# Patient Record
Sex: FEMALE | Race: WHITE | NOT HISPANIC OR LATINO | URBAN - METROPOLITAN AREA
[De-identification: names, ages, dates, MRNs, and addresses within clinical notes are randomized per-mention and may not be internally consistent; named-entity substitution may affect disease eponyms.]

---

## 2017-07-05 ENCOUNTER — OFFICE VISIT - RIVER FALLS (OUTPATIENT)
Dept: FAMILY MEDICINE | Facility: CLINIC | Age: 37
End: 2017-07-05

## 2017-07-05 ASSESSMENT — MIFFLIN-ST. JEOR: SCORE: 1815.54

## 2017-09-28 ENCOUNTER — COMMUNICATION - RIVER FALLS (OUTPATIENT)
Dept: FAMILY MEDICINE | Facility: CLINIC | Age: 37
End: 2017-09-28

## 2017-09-28 ENCOUNTER — AMBULATORY - RIVER FALLS (OUTPATIENT)
Dept: FAMILY MEDICINE | Facility: CLINIC | Age: 37
End: 2017-09-28

## 2017-09-29 LAB
CHOLEST SERPL-MCNC: 256 MG/DL
CHOLEST/HDLC SERPL: 3.7 {RATIO}
CREAT SERPL-MCNC: 1.07 MG/DL (ref 0.5–1.1)
GLUCOSE BLD-MCNC: 101 MG/DL (ref 65–99)
HBA1C MFR BLD: 4.9 %
HDLC SERPL-MCNC: 70 MG/DL
LDLC SERPL CALC-MCNC: 162 MG/DL
NONHDLC SERPL-MCNC: 186 MG/DL
TRIGL SERPL-MCNC: 119 MG/DL

## 2017-10-10 ENCOUNTER — OFFICE VISIT - RIVER FALLS (OUTPATIENT)
Dept: FAMILY MEDICINE | Facility: CLINIC | Age: 37
End: 2017-10-10

## 2017-10-10 ASSESSMENT — MIFFLIN-ST. JEOR: SCORE: 1806.47

## 2017-10-13 ENCOUNTER — AMBULATORY - RIVER FALLS (OUTPATIENT)
Dept: FAMILY MEDICINE | Facility: CLINIC | Age: 37
End: 2017-10-13

## 2017-10-30 ENCOUNTER — AMBULATORY - RIVER FALLS (OUTPATIENT)
Dept: FAMILY MEDICINE | Facility: CLINIC | Age: 37
End: 2017-10-30

## 2017-11-29 ENCOUNTER — AMBULATORY - RIVER FALLS (OUTPATIENT)
Dept: FAMILY MEDICINE | Facility: CLINIC | Age: 37
End: 2017-11-29

## 2017-12-05 ENCOUNTER — OFFICE VISIT - RIVER FALLS (OUTPATIENT)
Dept: FAMILY MEDICINE | Facility: CLINIC | Age: 37
End: 2017-12-05

## 2017-12-05 ASSESSMENT — MIFFLIN-ST. JEOR: SCORE: 1790.14

## 2017-12-07 ENCOUNTER — AMBULATORY - RIVER FALLS (OUTPATIENT)
Dept: FAMILY MEDICINE | Facility: CLINIC | Age: 37
End: 2017-12-07

## 2017-12-12 LAB
CREAT SERPL-MCNC: 0.93 MG/DL (ref 0.5–1.1)
GLUCOSE BLD-MCNC: 93 MG/DL (ref 65–99)

## 2018-01-29 ENCOUNTER — OFFICE VISIT - RIVER FALLS (OUTPATIENT)
Dept: FAMILY MEDICINE | Facility: CLINIC | Age: 38
End: 2018-01-29

## 2018-01-29 ASSESSMENT — MIFFLIN-ST. JEOR: SCORE: 1796.49

## 2018-02-19 ENCOUNTER — OFFICE VISIT - RIVER FALLS (OUTPATIENT)
Dept: FAMILY MEDICINE | Facility: CLINIC | Age: 38
End: 2018-02-19

## 2018-02-19 ASSESSMENT — MIFFLIN-ST. JEOR: SCORE: 1775.62

## 2018-06-05 ENCOUNTER — OFFICE VISIT - RIVER FALLS (OUTPATIENT)
Dept: FAMILY MEDICINE | Facility: CLINIC | Age: 38
End: 2018-06-05

## 2018-06-05 ASSESSMENT — MIFFLIN-ST. JEOR: SCORE: 1767.46

## 2018-06-14 ENCOUNTER — OFFICE VISIT - RIVER FALLS (OUTPATIENT)
Dept: FAMILY MEDICINE | Facility: CLINIC | Age: 38
End: 2018-06-14

## 2018-06-14 ASSESSMENT — MIFFLIN-ST. JEOR: SCORE: 1765.64

## 2018-08-22 ENCOUNTER — OFFICE VISIT - RIVER FALLS (OUTPATIENT)
Dept: FAMILY MEDICINE | Facility: CLINIC | Age: 38
End: 2018-08-22

## 2018-09-26 ENCOUNTER — OFFICE VISIT - RIVER FALLS (OUTPATIENT)
Dept: FAMILY MEDICINE | Facility: CLINIC | Age: 38
End: 2018-09-26

## 2018-09-26 ASSESSMENT — MIFFLIN-ST. JEOR: SCORE: 1739.34

## 2018-12-20 ENCOUNTER — OFFICE VISIT - RIVER FALLS (OUTPATIENT)
Dept: FAMILY MEDICINE | Facility: CLINIC | Age: 38
End: 2018-12-20

## 2019-01-01 ENCOUNTER — AMBULATORY - RIVER FALLS (OUTPATIENT)
Dept: FAMILY MEDICINE | Facility: CLINIC | Age: 39
End: 2019-01-01

## 2019-01-01 ENCOUNTER — COMMUNICATION - RIVER FALLS (OUTPATIENT)
Dept: FAMILY MEDICINE | Facility: CLINIC | Age: 39
End: 2019-01-01

## 2019-01-01 ENCOUNTER — OFFICE VISIT - RIVER FALLS (OUTPATIENT)
Dept: FAMILY MEDICINE | Facility: CLINIC | Age: 39
End: 2019-01-01

## 2019-01-01 LAB
ALT SERPL W P-5'-P-CCNC: 151 UNIT/L
AST SERPL W P-5'-P-CCNC: 135 UNIT/L
BUN SERPL-MCNC: 34 MG/DL
BUN SERPL-MCNC: 8 MG/DL (ref 7–25)
BUN/CREAT RATIO - HISTORICAL: ABNORMAL (ref 6–22)
CALCIUM SERPL-MCNC: 11 MEQ/DL
CALCIUM SERPL-MCNC: 9.9 MG/DL (ref 8.6–10.2)
CHLORIDE BLD-SCNC: 102 MMOL/L (ref 98–110)
CHOL/HDL RATIO - HISTORICAL: 3.6 UMOL/L
CHOLEST SERPL-MCNC: 272 MG/DL
CO2 SERPL-SCNC: 24 MMOL/L (ref 20–32)
CREAT SERPL-MCNC: 0.73 MG/DL (ref 0.5–1.1)
CREAT SERPL-MCNC: 2.01 MG/DL
EGFRCR SERPLBLD CKD-EPI 2021: 106 ML/MIN/1.73M2
GFR ESTIMATE EXT - HISTORICAL: 28 ML/MIN
GLUCOSE BLD-MCNC: 111 MG/DL (ref 65–99)
HBA1C MFR BLD: 4.4 %
HCT VFR BLD AUTO: 33.8 %
HDLC SERPL-MCNC: 75 MG/DL
HGB BLD-MCNC: 10.7 G/DL
LDLC SERPL CALC-MCNC: 174 MG/DL
PLATELET # BLD AUTO: 115 X10
POTASSIUM BLD-SCNC: 4.7 MMOL/L (ref 3.5–5.3)
POTASSIUM BLD-SCNC: 4.8 MEQ/L
SODIUM SERPL-SCNC: 137 MEQ/L
SODIUM SERPL-SCNC: 137 MMOL/L (ref 135–146)
T4 FREE SERPL-MCNC: 1.14 NG/DL
TRIGL SERPL-MCNC: 114 MG/DL
TSH SERPL DL<=0.005 MIU/L-ACNC: 4.78 MIU/L
VIT B12 SERPL-MCNC: 535 PG/ML
VITAMIN D, 1, 25-DIHYDROXY - QUEST: 72 NG/ML
VLDL-CHOLESTEROL: 23
WBC # BLD AUTO: 4.6 X10

## 2019-01-01 ASSESSMENT — MIFFLIN-ST. JEOR
SCORE: 1763.83
SCORE: 1657.69

## 2019-03-01 ENCOUNTER — OFFICE VISIT - RIVER FALLS (OUTPATIENT)
Dept: FAMILY MEDICINE | Facility: CLINIC | Age: 39
End: 2019-03-01

## 2019-03-01 ASSESSMENT — MIFFLIN-ST. JEOR: SCORE: 1711.21

## 2020-01-01 ENCOUNTER — COMMUNICATION - RIVER FALLS (OUTPATIENT)
Dept: FAMILY MEDICINE | Facility: CLINIC | Age: 40
End: 2020-01-01

## 2020-01-01 ENCOUNTER — OFFICE VISIT - RIVER FALLS (OUTPATIENT)
Dept: FAMILY MEDICINE | Facility: CLINIC | Age: 40
End: 2020-01-01

## 2020-01-01 ENCOUNTER — AMBULATORY - RIVER FALLS (OUTPATIENT)
Dept: FAMILY MEDICINE | Facility: CLINIC | Age: 40
End: 2020-01-01

## 2020-01-01 LAB
A/G RATIO - HISTORICAL: 1.5 (ref 1–2.5)
ALBUMIN SERPL-MCNC: 3.7 GM/DL (ref 3.6–5.1)
ALP SERPL-CCNC: 167 UNIT/L (ref 31–125)
ALT SERPL W P-5'-P-CCNC: 73 UNIT/L (ref 6–29)
AST SERPL W P-5'-P-CCNC: 140 UNIT/L (ref 10–30)
BILIRUB SERPL-MCNC: 1.4 MG/DL (ref 0.2–1.2)
BUN SERPL-MCNC: 11 MG/DL (ref 7–25)
BUN/CREAT RATIO - HISTORICAL: ABNORMAL (ref 6–22)
CALCIUM SERPL-MCNC: 9.8 MG/DL (ref 8.6–10.2)
CHLORIDE BLD-SCNC: 98 MMOL/L (ref 98–110)
CO2 SERPL-SCNC: 24 MMOL/L (ref 20–32)
CREAT SERPL-MCNC: 0.63 MG/DL (ref 0.5–1.1)
EGFRCR SERPLBLD CKD-EPI 2021: 115 ML/MIN/1.73M2
ERYTHROCYTE [DISTWIDTH] IN BLOOD BY AUTOMATED COUNT: 12.9 % (ref 11–15)
ETHANOL: NORMAL
ETHANOL: NORMAL MG/DL
GLOBULIN: 2.5 (ref 1.9–3.7)
GLUCOSE BLD-MCNC: 111 MG/DL (ref 65–99)
HCT VFR BLD AUTO: 30.2 % (ref 35–45)
HGB BLD-MCNC: 10.8 GM/DL (ref 11.7–15.5)
MCH RBC QN AUTO: 33.3 PG (ref 27–33)
MCHC RBC AUTO-ENTMCNC: 35.8 GM/DL (ref 32–36)
MCV RBC AUTO: 93.2 FL (ref 80–100)
PLATELET # BLD AUTO: 290 10*3/UL (ref 140–400)
PMV BLD: 9.7 FL (ref 7.5–12.5)
POTASSIUM BLD-SCNC: 3.6 MMOL/L (ref 3.5–5.3)
PROT SERPL-MCNC: 6.2 GM/DL (ref 6.1–8.1)
RBC # BLD AUTO: 3.24 10*6/UL (ref 3.8–5.1)
SODIUM SERPL-SCNC: 134 MMOL/L (ref 135–146)
T3FREE SERPL-MCNC: 2.6 PG/ML (ref 2.3–4.2)
T4 FREE SERPL-MCNC: 1.4 NG/DL (ref 0.8–1.8)
THYROID PEROXIDASE ANTIBODIES - HISTORICAL: <1 IU/ML
TSH SERPL DL<=0.005 MIU/L-ACNC: 7.39 MIU/L
TSH SERPL DL<=0.005 MIU/L-ACNC: 7.55 MIU/L
WBC # BLD AUTO: 6.1 10*3/UL (ref 3.8–10.8)

## 2020-01-01 ASSESSMENT — MIFFLIN-ST. JEOR
SCORE: 1687.63
SCORE: 1684
SCORE: 1662.22
SCORE: 1618.68
SCORE: 1680.37
SCORE: 1763.83

## 2020-01-25 ENCOUNTER — NURSE TRIAGE (OUTPATIENT)
Dept: NURSING | Facility: CLINIC | Age: 40
End: 2020-01-25

## 2020-01-25 NOTE — TELEPHONE ENCOUNTER
Ayaka is calling to see what time her appointment is on Monday, Jan. 27th.  No triage necessary.

## 2022-02-11 VITALS
DIASTOLIC BLOOD PRESSURE: 60 MMHG | OXYGEN SATURATION: 98 % | WEIGHT: 219.2 LBS | TEMPERATURE: 97.4 F | HEART RATE: 108 BPM | WEIGHT: 219 LBS | HEART RATE: 100 BPM | HEART RATE: 108 BPM | DIASTOLIC BLOOD PRESSURE: 72 MMHG | HEART RATE: 64 BPM | BODY MASS INDEX: 37.19 KG/M2 | DIASTOLIC BLOOD PRESSURE: 90 MMHG | HEIGHT: 65 IN | HEIGHT: 65 IN | OXYGEN SATURATION: 99 % | SYSTOLIC BLOOD PRESSURE: 128 MMHG | DIASTOLIC BLOOD PRESSURE: 86 MMHG | OXYGEN SATURATION: 99 % | BODY MASS INDEX: 37.01 KG/M2 | WEIGHT: 224.8 LBS | TEMPERATURE: 97.9 F | WEIGHT: 224 LBS | TEMPERATURE: 97.4 F | BODY MASS INDEX: 37.45 KG/M2 | OXYGEN SATURATION: 98 % | HEIGHT: 65 IN | BODY MASS INDEX: 36.52 KG/M2 | BODY MASS INDEX: 40.25 KG/M2 | DIASTOLIC BLOOD PRESSURE: 50 MMHG | DIASTOLIC BLOOD PRESSURE: 96 MMHG | TEMPERATURE: 97.8 F | BODY MASS INDEX: 37.32 KG/M2 | HEIGHT: 65 IN | OXYGEN SATURATION: 98 % | HEART RATE: 103 BPM | TEMPERATURE: 98.5 F | WEIGHT: 241.6 LBS | HEIGHT: 65 IN | SYSTOLIC BLOOD PRESSURE: 137 MMHG | SYSTOLIC BLOOD PRESSURE: 116 MMHG | SYSTOLIC BLOOD PRESSURE: 122 MMHG | WEIGHT: 223.2 LBS | SYSTOLIC BLOOD PRESSURE: 94 MMHG | SYSTOLIC BLOOD PRESSURE: 128 MMHG

## 2022-02-11 VITALS
WEIGHT: 236.2 LBS | SYSTOLIC BLOOD PRESSURE: 130 MMHG | HEART RATE: 72 BPM | TEMPERATURE: 97.6 F | HEIGHT: 65 IN | DIASTOLIC BLOOD PRESSURE: 74 MMHG | BODY MASS INDEX: 39.35 KG/M2

## 2022-02-11 VITALS
WEIGHT: 225 LBS | SYSTOLIC BLOOD PRESSURE: 132 MMHG | BODY MASS INDEX: 38.02 KG/M2 | TEMPERATURE: 98.8 F | HEART RATE: 80 BPM | DIASTOLIC BLOOD PRESSURE: 86 MMHG | OXYGEN SATURATION: 98 %

## 2022-02-11 VITALS
TEMPERATURE: 98.5 F | WEIGHT: 242.4 LBS | HEIGHT: 65 IN | BODY MASS INDEX: 40.32 KG/M2 | SYSTOLIC BLOOD PRESSURE: 130 MMHG | HEIGHT: 65 IN | BODY MASS INDEX: 40.39 KG/M2 | SYSTOLIC BLOOD PRESSURE: 124 MMHG | WEIGHT: 242 LBS | DIASTOLIC BLOOD PRESSURE: 92 MMHG | DIASTOLIC BLOOD PRESSURE: 72 MMHG | HEART RATE: 64 BPM

## 2022-02-11 VITALS
BODY MASS INDEX: 41.82 KG/M2 | DIASTOLIC BLOOD PRESSURE: 80 MMHG | SYSTOLIC BLOOD PRESSURE: 128 MMHG | SYSTOLIC BLOOD PRESSURE: 137 MMHG | HEIGHT: 65 IN | WEIGHT: 251 LBS | DIASTOLIC BLOOD PRESSURE: 88 MMHG | HEART RATE: 76 BPM | DIASTOLIC BLOOD PRESSURE: 100 MMHG | SYSTOLIC BLOOD PRESSURE: 122 MMHG | HEART RATE: 88 BPM

## 2022-02-11 VITALS
DIASTOLIC BLOOD PRESSURE: 80 MMHG | DIASTOLIC BLOOD PRESSURE: 84 MMHG | HEART RATE: 88 BPM | TEMPERATURE: 98 F | TEMPERATURE: 98.9 F | HEART RATE: 74 BPM | HEIGHT: 65 IN | WEIGHT: 244.2 LBS | DIASTOLIC BLOOD PRESSURE: 90 MMHG | HEART RATE: 80 BPM | BODY MASS INDEX: 41.45 KG/M2 | WEIGHT: 248.8 LBS | SYSTOLIC BLOOD PRESSURE: 132 MMHG | TEMPERATURE: 99.6 F | HEIGHT: 65 IN | BODY MASS INDEX: 40.69 KG/M2 | HEIGHT: 65 IN | WEIGHT: 247.4 LBS | SYSTOLIC BLOOD PRESSURE: 132 MMHG | BODY MASS INDEX: 41.22 KG/M2 | SYSTOLIC BLOOD PRESSURE: 140 MMHG

## 2022-02-11 VITALS
DIASTOLIC BLOOD PRESSURE: 102 MMHG | SYSTOLIC BLOOD PRESSURE: 144 MMHG | BODY MASS INDEX: 42.15 KG/M2 | HEART RATE: 105 BPM | WEIGHT: 253 LBS | HEIGHT: 65 IN | TEMPERATURE: 99.1 F

## 2022-02-11 VITALS
SYSTOLIC BLOOD PRESSURE: 124 MMHG | BODY MASS INDEX: 36.35 KG/M2 | TEMPERATURE: 98.2 F | DIASTOLIC BLOOD PRESSURE: 80 MMHG | HEART RATE: 90 BPM | WEIGHT: 230 LBS | HEART RATE: 76 BPM | WEIGHT: 218.2 LBS | HEIGHT: 65 IN | BODY MASS INDEX: 38.32 KG/M2 | DIASTOLIC BLOOD PRESSURE: 60 MMHG | SYSTOLIC BLOOD PRESSURE: 100 MMHG | HEIGHT: 65 IN | TEMPERATURE: 97.6 F

## 2022-02-11 VITALS
WEIGHT: 209.6 LBS | BODY MASS INDEX: 34.92 KG/M2 | SYSTOLIC BLOOD PRESSURE: 140 MMHG | HEIGHT: 65 IN | TEMPERATURE: 98.9 F | DIASTOLIC BLOOD PRESSURE: 90 MMHG | HEART RATE: 88 BPM

## 2022-02-11 VITALS — DIASTOLIC BLOOD PRESSURE: 90 MMHG | SYSTOLIC BLOOD PRESSURE: 142 MMHG

## 2022-02-16 NOTE — PROGRESS NOTES
Chief Complaint    c/o lump in left armpit noticed it this AM, left ear pain  History of Present Illness      Noticed lump in the left armpit this morning with minimal pain and actually improving.        Left ear started hurting today. Denies hearing loss. Denies discharge from the ear. Pain gone with ibuprofen.  Review of Systems      No fevers       No vomiting       Just returned from [place]  Physical Exam   Vitals & Measurements    T: 99.6(Tympanic)  HR: 74(Peripheral)  BP: 132/84     HT: 64.5 in       General: No acute distress      Musculoskeletal: Normal gait      Skin: In the left axilla inferiorly with an area of erythema and small subcutaneous lumps       Ears: Normal tympanic membranes  Assessment/Plan   Rash: Possible hidradenitis suppuritiva.  She has never had this before.  Will try clindamycin topical twice daily for up to 3 weeks.  Information handout given.  Follow-up if not improving.    Left otalgia: No signs of infection and will monitor  Patient Information     Name:UNA BRIONES      Address:      [address] [place], [place] [address]     Sex:Female     YOB: 1980     [phone]     Emergency Contact:ERVIN BRIONES     MRN:[id number]     FIN:[id number]     Location:[facility]     Date of Service:2018      Primary Care Physician:       Brenda Jacobo, [phone]  Procedure/Surgical History     Tonsillectomy and adenoidectomy ()     LEEP     [place] tooth  Medications        NuvaRing 0.120 mg-0.015 mg/24 hours vaginal rin EA, VAG, q4wk, 3 EA, 1 Refill(s).        lisinopril 20 mg oral tablet: 20 mg, 1 tab(s), PO, Daily, 30 tab(s), 0 Refill(s).        lisinopril 20 mg oral tablet: See Instructions, TAKE 1 TABLET BY MOUTH DAILY, 90 tab(s).        Afrin Nasal Sinus: 2 spray(s), nasal, bid, 0 Refill(s).                Allergies    Zithromax (face swelling)

## 2022-02-16 NOTE — NURSING NOTE
Comprehensive Intake Entered On:  2/10/2020 2:49 PM CST    Performed On:  2/10/2020 2:42 PM CST by Saray George LPN               Summary   Chief Complaint :   here today in f/u - reports no improvement, continues to c/o heartburn and weakness, thinking that she may need another note for work as she doesn't feel ready yet   Advance Directive :   Yes   Menstrual Status :   Menarcheal   Weight Measured :   219.2 lb(Converted to: 219 lb 3 oz, 99.43 kg)    Height Measured :   64.5 in(Converted to: 5 ft 4 in, 163.83 cm)    Body Mass Index :   37.04 kg/m2 (HI)    Body Surface Area :   2.12 m2   Systolic Blood Pressure :   128 mmHg   Diastolic Blood Pressure :   86 mmHg (HI)    Mean Arterial Pressure :   100 mmHg   Peripheral Pulse Rate :   108 bpm (HI)    BP Site :   Right arm   BP Method :   Manual   Temperature Tympanic :   97.4 DegF(Converted to: 36.3 DegC)  (LOW)    Oxygen Saturation :   99 %   Saray George LPN - 2/10/2020 2:42 PM CST   Health Status   Allergies Verified? :   Yes   Medication History Verified? :   Yes   Medical History Verified? :   No   Pre-Visit Planning Status :   Not completed   Tobacco Use? :   Former smoker   Saray George LPN - 2/10/2020 2:42 PM CST   Meds / Allergies   (As Of: 2/10/2020 2:49:02 PM CST)   Allergies (Active)   azithromycin  Estimated Onset Date:   Unspecified ; Created By:   Lisa Garduno; Reaction Status:   Active ; Category:   Drug ; Substance:   azithromycin ; Type:   Allergy ; Updated By:   Lisa Garduno; Reviewed Date:   12/11/2019 5:15 PM CST        Medication List   (As Of: 2/10/2020 2:49:02 PM CST)   Prescription/Discharge Order    esomeprazole  :   esomeprazole ; Status:   Prescribed ; Ordered As Mnemonic:   NexIUM 20 mg oral delayed release capsule ; Simple Display Line:   20 mg, 1 cap(s), Oral, daily, 90 cap(s), 0 Refill(s) ; Ordering Provider:   Brenda Jacobo; Catalog Code:   esomeprazole ; Order Dt/Tm:   2/3/2020 4:40:52 PM CST           sucralfate  :   sucralfate ; Status:   Prescribed ; Ordered As Mnemonic:   Carafate 1 g oral tablet ; Simple Display Line:   1 gm, 1 tab(s), Oral, qid, for 30 day(s), 120 tab(s), 0 Refill(s) ; Ordering Provider:   Brenda Jacobo; Catalog Code:   sucralfate ; Order Dt/Tm:   2/3/2020 4:39:38 PM CST          famotidine  :   famotidine ; Status:   Prescribed ; Ordered As Mnemonic:   famotidine 20 mg oral tablet ; Simple Display Line:   20 mg, 1 tab(s), Oral, bid, 60 tab(s), 0 Refill(s) ; Ordering Provider:   Brenda Jacobo; Catalog Code:   famotidine ; Order Dt/Tm:   2/3/2020 4:42:02 PM CST

## 2022-02-16 NOTE — TELEPHONE ENCOUNTER
---------------------  From: Nadya DECKER, Michelle BOWER (Phone Messages Pool ([id number]_[facility]))   To: [facility] Message Pool ([id number]_[facility]);     Sent: 2/20/2020 1:21:59 PM CST  Subject: Patient question     Phone message    PCP:   [facility]      Time of Call:  1316       Person Calling:  Pt  Phone number:  [phone]    Returned call at: _    Note:   Pt states she has an appointment for endoscopy tomorrow but is concerned because she has still not had a bowel movement.    She wants to know from [facility] if she should take something to force her to go or what her thoughts are.    Please advise.    Last office visit and reason:  2/19/20, hosp f/u---------------------  From: Suma Lockwood ([facility] Message Pool ([id number]_[facility]))   To: Brenda Jacobo;     Sent: 2/20/2020 1:47:26 PM CST  Subject: FW: Patient question---------------------  From: Brenda Jacobo   To: [facility] Message Pool ([id number]_[facility]);     Sent: 2/20/2020 2:46:47 PM CST  Subject: RE: Patient question     If the scope is tomorrow why doesn't she call the surgeon's office and ask if miralax is okay because I would imagine she has some dietary restrictions that she is to follownotified pt on identified VM.

## 2022-02-16 NOTE — PROGRESS NOTES
Patient:   UNA BRIONES            MRN: [id number]            FIN: [id number]               Age:   37 years     Sex:  Female     :  1980   Associated Diagnoses:   Pain from laceration   Author:   Brenda Jacobo      Visit Information      Date of Service: 2018 12:36 pm  Performing Location: [facility]  Encounter#: [id number]      Primary Care Provider (PCP):  Brenda Jacobo    NPI# [id number]      Referring Provider:  Brenda Jacobo    NPI# [id number]      Chief Complaint   2018 12:40 PM CDT    discuss pain- currently has sutures in buttock was placed on         History of Present Illness   confirmed chief complaint with patient  She fell on a [employer]  blade about 40 hours ago, went to the ER and had two lacerations sutured.   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  She is using 2000mg ibuprofen every 6 hours during the day. She has been so uncomfortable the last 24 hours that she returned to the ER for a few more vicodin (still has 2 left) and was started on keflex.  She feels like the pain is simply from the laceration, denies any nerve  problems or mobility problems. Tetanus is UTD         Review of Systems             Health Status   Allergies:    Allergic Reactions (Selected)  Severity Not Documented  Zithromax (Face swelling)   Medications:  (Selected)   Prescriptions  Prescribed  Norco 5 mg-325 mg oral tablet: 2 tab(s), po, q6 hrs, # 24 tab(s), 0 Refill(s), Type: Maintenance, Pharmacy: [id number], 2 tab(s) po q6 hrs  lisinopril 20 mg oral tablet: 1 tab(s) ( 20 mg ), PO, Daily, # 30 tab(s), 0 Refill(s), Type: Maintenance, Pharmacy: [facility] [id number], 1 tab(s) po daily  lisinopril 20 mg oral tablet: 1 tab(s) ( 20 mg ), po, daily, # 90 tab(s), 0 Refill(s), Type: Maintenance,  Pharmacy: [id number], 1 tab(s) po daily  Documented Medications  Documented  Afrin Nasal Sinus: 2 spray(s), nasal, bid, 0 Refill(s), Type: Maintenance  Keflex: po, 0 Refill(s), Type: Maintenance  Norco 5 mg-325 mg oral tablet: 1 tab(s), po, q6 hrs, 0 Refill(s), Type: Maintenance   Problem list:    All Problems  PCOS (Polycystic Ovarian Syndrome) / ICD-9-.4 / Confirmed  Hyperlipemia / SNOMED CT 65171583 / Confirmed  Obesity / ICD-9-.00 / Confirmed  BMI 40.0-44.9, adult / ICD-9-CM V85.41 / Confirmed  HTN (Hypertension) / ICD-9-.9 / Confirmed  Neuritis of foot / SNOMED CT 988339587 / Confirmed  R foot - [facility] (Dr Renetta Martin)  Congenital pes planovalgus / SNOMED CT 25020470 / Confirmed  [facility] (Dr Renetta Martin)  Menarche / SNOMED CT 29760986 / Confirmed  Seasonal allergies / SNOMED CT 848179373 / Confirmed  Fatty liver / SNOMED CT 835654498 / Confirmed  Inactive: History of abnormal pap smear  Canceled: Cervical dysplasia / SNOMED CT 271005050  Severe with LEEP/cone biopsy.      Histories   Past Medical History:    Active  Neuritis of foot (373030644): Onset in the month of 2014 at 33 years  Comments:  2014 CST 1:59 PM CST Saray Earl CMA foot - [facility] (Dr Renetta Martin)  Congenital pes planovalgus (01884523): Onset in the month of 2014 at 33 years  Comments:  2014 CST 2:00 PM Saray Landa CMA  [facility] (Dr Renetta Martin)  PCOS (Polycystic Ovarian Syndrome) (256.4): Onset on 2007 at 26 years.  Menarche (99859615): Onset in  at 15 years.  Seasonal allergies (751382547)   Family History:    Gout  Father  Diabetes mellitus  Grandmother (M)  Hypertension  Father  Hypothyroidism  Mother ()  Heart attack  Mother (): onset at 53 .  Multiple sclerosis  Mother ()  CA - Cancer of prostate  Father: onset at 60 .     Procedure history:    Tonsillectomy and adenoidectomy in  at 9 Years.  AMINA  (SNOMED CT 78775262).  [place] tooth (SNOMED CT 52009995).   Social History:        Alcohol Assessment: Current            Current                     Comments:                      09/04/2013 - Norma Forrest RN                     1 -3 glasses of wine on some week ends some times      Tobacco Assessment            Past                     Comments:                      04/29/2014 - Saray George CMA                     occasional previous tobacco use      Substance Abuse Assessment            Never      Employment and Education Assessment            Employed, Work/School description: Social Work.      Home and Environment Assessment            Marital status: .  Spouse/Partner name: Damian Mcgee.  Lives with Spouse.      Nutrition and Health Assessment            Type of diet: Regular.      Exercise and Physical Activity Assessment                     Comments:                      08/13/2015 - Rina Cain RN                     No regular exercise      Sexual Assessment            Sexually active: Yes.  Sexual orientation: Heterosexual.  Uses condoms: No.        Physical Examination   Vital Signs   6/5/2018 12:40 PM CDT Temperature Tympanic 98.5 DegF    Pulse Site Radial artery    HR Method Manual    Systolic Blood Pressure 130 mmHg    Diastolic Blood Pressure 92 mmHg  HI    Mean Arterial Pressure 105 mmHg    BP Site Right arm    BP Method Manual      Measurements from flowsheet : Measurements   6/5/2018 12:40 PM CDT Height Measured - Standard 64.5 in    Weight Measured - Standard 242.4 lb    BSA 2.23 m2    Body Mass Index 40.96 kg/m2  HI      General:  Alert and oriented, appears uncomfortable sitting  She has good muscle tones and sensation in the left leg  The dressing is removed and laceration x2  is approximated with sutures in tact. No active drainage, no signs of infection. She tolerates me palpating around the wound, there is no deep mass palpable. There is minimal ecchymosis.        Impression and Plan   Diagnosis     Pain from laceration (VFO02-DV R52).     Patient Instructions:       Counseled: Patient, Regarding diagnosis, Regarding treatment, Regarding medications, Verbalized understanding, Counseled on symptomatic management. Return to clinic for re evaluation if worsening, simply not improving, or failure to resolve.   WIll adjust pain med such that she uses  Ibuprofen 800mg in a.m., followed by acetaminophen 1000mg 4 hours later and ibuprofen 800mg 4 hours later, then after she returns from work she can take 2 vicodin followed by 2 more 5 hours later at hs. She should see gradual improvement over the next 4-5 days and rtc if worsening in any way, otherwise in 8 more days for suture removal.    Orders     Orders (Selected)   Prescriptions  Prescribed  Norco 5 mg-325 mg oral tablet: 2 tab(s), po, q6 hrs, # 24 tab(s), 0 Refill(s), Type: Maintenance, Pharmacy: [id number], 2 tab(s) po q6 hrs.

## 2022-02-16 NOTE — PROGRESS NOTES
Patient:   UNA BRIONES            MRN: [id number]            FIN: [id number]               Age:   39 years     Sex:  Female     :  1980   Associated Diagnoses:   Nasal sore   Author:   Luciano Rothman MD      Visit Information      Date of Service: 2019 09:44 am  Performing Location: [facility]  Encounter#: [id number]      Primary Care Provider (PCP):  Aguila FLORCBrenda    NPI# [id number]      Referring Provider:  Luciano Rothman MD    NPI# [id number]      Chief Complaint   2019 10:03 AM CST  c/o sore inside of left nostril        Subjective   Chief complaint 2019 10:03 AM CST  c/o sore inside of left nostril  .     see chief complaint as noted above and confirmed with the patient  feels like their is a sore,  breathing is OK      Health Status   Allergies:    Allergic Reactions (Selected)  Severity Not Documented  Azithromycin (No reactions were documented)   Medications:  (Selected)   Prescriptions  Prescribed  Bactroban 2% nasal ointment: 1 stormy, Nasal, BID, Instructions: oint or cream most affordable, # 1 gm, 0 Refill(s), Type: Maintenance, Pharmacy: [facility] #[id number], 1 stormy Nasal bid,x10 day(s),Instr:oint or cream most affordable  Bactroban 2% nasal ointment: 1 stormy, Nasal, bid, x 7 day(s), # 1 gm, 0 Refill(s), Type: Acute, Pharmacy: [facility] #[id number], 1 stormy Nasal bid,x7 day(s)  Nasonex 50 mcg/inh nasal spray: 2 spray(s), nasal, bid, # 1 EA, 11 Refill(s), Type: Maintenance, Pharmacy: [facility] [id number], 2 spray(s) Nasal bid  losartan 25 mg oral tablet: = 1 tab(s), Oral, daily, # 14 tab(s), 0 Refill(s), Type: Maintenance, Pharmacy: [facility] #[id number], Patient lost rx from 19 - needs to keep appt. scheduled 19, 1 tab(s) Oral daily  medroxyPROGESTERone 10 mg oral tablet: See Instructions, Instructions: TAKE ONE TABLET BY MOUTH EVERY DAY FOR 10 DAYS EVERY 3 MONTHS., # 10 tab(s), Type: Soft Stop, Pharmacy: [facility] #[id number], TAKE ONE TABLET BY MOUTH EVERY DAY FOR 10 DAYS EVERY 3 MONTHS.  scopolamine 1.5 mg transdermal film, extended release: 1 patch(es) ( 1.5 mg ), TOP, q72hr, Instructions: apply 4 hours before event, PRN: for motion sickness, # 4 EA, 0 Refill(s), Type: Maintenance, [id number], 1 patch(es) Topical q72 hrs,PRN:for motion sickness,Instr:apply 4 ho...,    Medications          *denotes recorded medication          losartan 25 mg oral tablet: 1 tab(s), Oral, daily, 14 tab(s), 0 Refill(s).          medroxyPROGESTERone 10 mg oral tablet: See Instructions, TAKE ONE TABLET BY MOUTH EVERY DAY FOR 10 DAYS EVERY 3 MONTHS., 10 tab(s).          Nasonex 50 mcg/inh nasal spray: 2 spray(s), nasal, bid, 1 EA, 11 Refill(s).          Bactroban 2% nasal ointment: 1 stormy, Nasal, BID, for 10 day(s), oint or cream most affordable, 1 gm, 0 Refill(s).          Bactroban 2% nasal ointment: 1 stormy, Nasal, bid, for 7 day(s), 1 gm, 0 Refill(s).          scopolamine 1.5 mg transdermal film, extended release: 1.5 mg, 1 patch(es), TOP, q72hr, apply 4 hours before event, PRN: for motion sickness, 4 EA, 0 Refill(s).       Problem list:    All Problems (Selected)  PCOS (polycystic ovarian syndrome) / 901117348 / Confirmed  Hyperlipemia / 59875851 / Confirmed  Obesity / 278.00 / Confirmed  BMI 40.0-44.9, adult / V85.41 / Confirmed  HTN (hypertension) / 7780351837 / Confirmed  Neuritis of foot / 650515459 / Confirmed  R foot - [facility] (Dr Renetta Martin)  Congenital pes planovalgus / 63750422 / Confirmed  [facility] (Dr Renetta Martin)  Menarche / 76788517 / Confirmed  Seasonal allergies / 357356476 / Confirmed  Fatty liver / 851795975 / Confirmed  Alcohol abuse / 44027711 / Confirmed  Hospitalized RFAH  Elevated creatine kinase / 7855971127 / Confirmed  Grief / 442709342 / Confirmed      Objective   Vital Signs   12/11/2019 10:03 AM CST Peripheral Pulse Rate 64 bpm    Systolic Blood Pressure 122 mmHg     Diastolic Blood Pressure 60 mmHg    Mean Arterial Pressure 81 mmHg      Measurements from flowsheet : Measurements   12/11/2019 10:03 AM CST Height Measured - Standard 64.5 in    Weight Measured - Standard 241.6 lb    BSA 2.23 m2    Body Mass Index 40.83 kg/m2  HI      General:  Alert and oriented, No acute distress.    HENT:  left nares on medial side their is mild inflammation and some redness.    Integumentary:  Warm.    Psychiatric:  Cooperative, Appropriate mood & affect.       Impression and Plan   Assessment and Plan:          Diagnosis: Nasal sore (YTI23-LO J34.89).    Orders      (Selected)   Prescriptions  Prescribed  Bactroban 2% nasal ointment: 1 stormy, Nasal, bid, x 7 day(s), # 1 gm, 0 Refill(s), Type: Acute, Pharmacy: [facility] #[id number], 1 stormy Nasal bid,x7 day(s).     Reviewed expected course, what to watch for and when to return..     .

## 2022-02-16 NOTE — TELEPHONE ENCOUNTER
---------------------  From: Zabrina Davies CMA (Phone Messages Pool ([id number]_[facility]))   To: [facility] Message Pool ([id number]_[facility]);     Sent: 2/3/2020 11:25:57 AM CST  Subject: Heartburn       Phone Message NASIMI    PCP:    [facility]      Time of Call:  9:42       Person Calling:   Damian  Phone number:      Time returned call:      Note:  Patient has an appointment with [facility] at 4:10 today.  Damian patient's  wanted to give [facility] additional information as patient has trouble articulating symptoms well.      Patient has been experiencing severe heartburn all day every day..  This hasn't gone away even after stopping alcohol.  Patient also is not eating and is experiencing bad sweats.        Transferred to: [employer] pool---------------------  From: Saray George LPN ([facility] Message Pool ([id number]_[facility]))   To: Brenda Jacobo;     Sent: 2/3/2020 1:20:32 PM CST  Subject: FW: Heartburn---------------------  From: Brenda Jacobo   To: [facility] Message Pool ([id number]_[facility]);     Sent: 2/3/2020 2:04:45 PM CST  Subject: RE: Heartburn     i got voice mail when I tried her number.  I will talk with both of them when she comes todayNoted.

## 2022-02-16 NOTE — TELEPHONE ENCOUNTER
---------------------  From: Torie Brar   To: Aguila ROSA, Brenda;     Sent: 3/17/2020 9:32:42 AM CDT  Subject: Scheduling Management     Cancelled an appointment for a follow up visit and did not reschedule.noted

## 2022-02-16 NOTE — PROGRESS NOTES
Patient:   UNA BRIONES            MRN: [id number]            FIN: [id number]               Age:   38 years     Sex:  Female     :  1980   Associated Diagnoses:   HTN (Hypertension); Immunization due; PCOS (Polycystic Ovarian Syndrome)   Author:   Brenda Jacobo      Chief Complaint      History of Present Illness   confirmed chief complaint with patient  here for med rechek  has been swimming and lost some wt  then dad became very ill and stopped exercise  has also been drinking too much ETOH over the summer but now is tapering back with that.  bp well controlled, no swelling, rare tobacco use    She stopped nuvaring long ago, she and partner never have become preg over many years  She does not have menses when she is not on birth control, this has been her pattern.   Few years ago she used provera to shed lining of uterus but no menses x8 months without hormone use now.           Review of Systems             Health Status   Allergies:    Allergic Reactions (Selected)  Severity Not Documented  Zithromax (Face swelling)   Medications:  (Selected)   Prescriptions  Prescribed  Provera 10 mg oral tablet: See Instructions, Instructions: 1 tab daily x10 days every 3 months, # 10 tab(s), 3 Refill(s), Type: Maintenance, Pharmacy: [id number], 1 tab daily x10 days every 3 months  lisinopril 20 mg oral tablet: = 1 tab(s) ( 20 mg ), po, daily, # 90 tab(s), 1 Refill(s), Type: Maintenance, Pharmacy: [id number], 1 tab(s) Oral daily  Documented Medications  Documented  Afrin Nasal Sinus: 2 spray(s), nasal, bid, 0 Refill(s), Type: Maintenance   Problem list:    All Problems  PCOS (Polycystic Ovarian Syndrome) / ICD-9-.4 / Confirmed  Hyperlipemia / SNOMED CT 64081287 / Confirmed  Obesity / ICD-9-.00 / Confirmed  BMI 40.0-44.9, adult / ICD-9-CM V85.41 / Confirmed  HTN (Hypertension) / ICD-9-.9 / Confirmed  Neuritis of foot / SNOMED CT 494452912 / Confirmed  R foot - [facility] (Dr Renetta Martin)  Congenital pes planovalgus / SNOMED CT 25115496 / Confirmed  [facility] (Dr Renetta Martin)  Menarche / SNOMED CT 35499068 / Confirmed  Seasonal allergies / SNOMED CT 133544340 / Confirmed  Fatty liver / SNOMED CT 576287668 / Confirmed  Inactive: History of abnormal pap smear  Canceled: Cervical dysplasia / SNOMED CT 925690118  Severe with LEEP/cone biopsy.      Histories   Past Medical History:    Active  Neuritis of foot (479430256): Onset in the month of 2014 at 33 years  Comments:  2014 CST 1:59 PM Saray Landa CMA  R foot - [facility] (Dr Renetta Martin)  Congenital pes planovalgus (82006378): Onset in the month of 2014 at 33 years  Comments:  2014 CST 2:00 PM Saray Landa CMA, [facility] (Dr Renetta Martin)  PCOS (Polycystic Ovarian Syndrome) (256.4): Onset on 2007 at 26 years.  Menarche (07513382): Onset in  at 15 years.  Seasonal allergies (856823460)   Family History:    Gout  Father  Diabetes mellitus  Grandmother (M)  Hypertension  Father  Hypothyroidism  Mother ()  Heart attack  Mother (): onset at 53 .  Multiple sclerosis  Mother ()  CA - Cancer of prostate  Father: onset at 60 .     Procedure history:    Tonsillectomy and adenoidectomy in  at 9 Years.  LEEP (SNOMED CT 09885500).  [place] tooth (SNOMED CT 28974472).   Social History:        Alcohol Assessment: Current            Current                     Comments:                      2013 - Norma Forrest RN                     1 -3 glasses of wine on some week ends some times      Tobacco Assessment            Past                     Comments:                      2014 - Saray George CMA                     occasional previous tobacco use      Substance Abuse Assessment            Never      Employment and Education Assessment            Employed, Work/School description: Social Work.      Home and Environment  Assessment            Marital status: .  Spouse/Partner name: Damian Mcgee.  Lives with Spouse.      Nutrition and Health Assessment            Type of diet: Regular.      Exercise and Physical Activity Assessment                     Comments:                      08/13/2015 - Ant DECKER, Rina                     No regular exercise      Sexual Assessment            Sexually active: Yes.  Sexual orientation: Heterosexual.  Uses condoms: No.        Physical Examination   Vital Signs   9/26/2018 12:37 PM CDT Temperature Tympanic 97.6 DegF  LOW    Peripheral Pulse Rate 72 bpm    Pulse Site Radial artery    HR Method Manual    Systolic Blood Pressure 130 mmHg    Diastolic Blood Pressure 74 mmHg    Mean Arterial Pressure 93 mmHg    BP Site Right arm    BP Method Manual      Measurements from flowsheet : Measurements   9/26/2018 12:37 PM CDT Height Measured - Standard 64.5 in    Weight Measured - Standard 236.2 lb    BSA 2.21 m2    Body Mass Index 39.91 kg/m2  HI      General:  Alert and oriented, No acute distress.    Neck:  Supple, Non-tender, No lymphadenopathy, No thyromegaly.    Respiratory:  Lungs are clear to auscultation, Respirations are non-labored, Breath sounds are equal.    Cardiovascular:  Normal rate, Regular rhythm, No murmur.    Musculoskeletal:  Normal range of motion, Normal strength.    Integumentary:  Warm, Dry, Pink, No rash.    Neurologic:  No focal deficits.    Psychiatric:  Appropriate mood & affect.       Impression and Plan   Diagnosis     HTN (Hypertension) (FFZ12-TE I10).     Immunization due (TUL23-OC Z23).     PCOS (Polycystic Ovarian Syndrome) (HKK83-LD E28.2).     Plan:  will do provera challenge every 3months to reduce endometrial CA risk, she is to call me after provera use and let me know if she did have bleeding or not.    Continue lisinopril and labs/annual exam in 6 months.    Patient Instructions:       Counseled: Patient, Regarding diagnosis, Regarding treatment,  Regarding medications, Verbalized understanding, Counseled on symptomatic management. Return to clinic for re evaluation if worsening, simply not improving, or failure to resolve.   .    Orders     Orders (Selected)   Prescriptions  Prescribed  Provera 10 mg oral tablet: See Instructions, Instructions: 1 tab daily x10 days every 3 months, # 10 tab(s), 3 Refill(s), Type: Maintenance, Pharmacy: [id number], 1 tab daily x10 days every 3 months  lisinopril 20 mg oral tablet: = 1 tab(s) ( 20 mg ), po, daily, # 90 tab(s), 1 Refill(s), Type: Maintenance, Pharmacy: [id number], 1 tab(s) Oral daily.

## 2022-02-16 NOTE — RESULTS
Patient:   UNA BRIONES            MRN: [id number]            FIN: [id number]               Age:   39 years     Sex:  Female     :  1980   Associated Diagnoses:   Sinus tachycardia   Author:   Jacob GIMENEZ, Renaldo      Procedure   EKG procedure   Indication: Weakness.     EKG findings   Interpretation: by primary care provider.     Sinus tachycardia rhythm. Normal QTc and OK interval with no qwaves or ST changes. Good R wave progression .        Impression and Plan   Diagnosis     Sinus tachycardia (JBG21-DP R00.0).     normal EKG.

## 2022-02-16 NOTE — TELEPHONE ENCOUNTER
---------------------  From: Stephanie Calhoun CMA (Phone Messages Pool ([id number]_[facility]))   To: [facility] Message Pool ([id number]_[facility]);     Sent: 12/9/2019 2:43:17 PM CST  Subject: Losartan refill         Phone Message    PCP:   LISS      Time of Call:  2:30pm       Person Calling:  Madelyn  Phone number:  [phone] ok to TARA    Returned call at: _    Note:   Madelyn PACHECO stating that she lost her Losartan over the weekend and is wondering if she could get a prescription to last until her appt. on Monday next week.  LF: 12/2/19 30 tabs3:20pm Called and spoke with pt to inform her I renewed her Losartan x14 days. Reminded her to keep her upcoming appt. scheduled for 12/16/19 with LISS.

## 2022-02-16 NOTE — PROGRESS NOTES
Patient:   UNA BRIONES            MRN: [id number]            FIN: [id number]               Age:   39 years     Sex:  Female     :  1980   Associated Diagnoses:   Alcohol abuse   Author:   Brenda Jacobo      Visit Information      Date of Service: 2020 08:41 am  Performing Location: [facility]  Encounter#: [id number]   Visit type:  Telephone Encounter.    Source of history:  Patient.    Location of patient:  _home  Call Start Time:   _1215  Call End Time:   1220 _      Chief Complaint   _      History of Present Illness   Today's visit was conducted via telephone due to the COVID-19 pandemic.     Reason for visit:  _see note written under communication today      Impression and Plan   Diagnosis     Alcohol abuse (ZQE12-OG F10.10).        Health Status   Allergies:    Allergic Reactions (Selected)  Severity Not Documented  Azithromycin (No reactions were documented)   Medications:  (Selected)   Prescriptions  Prescribed  Protonix 20 mg oral delayed release tablet: = 1 tab(s) ( 20 mg ), Oral, bid, # 60 tab(s), 2 Refill(s), Type: Maintenance, Pharmacy: [facility] #[id number], attempt at dose reduction, 1 tab(s) Oral bid,x30 day(s)  sucralfate 1 g oral tablet: 1 tab(s), Oral, qid, # 120 tab(s), Type: Soft Stop, Pharmacy: [facility] #[id number]  Documented Medications  Documented  Tylenol: ( 650 mg ), Oral, q4 hrs, Instructions: not to exceed 4000 mg/day - per [facility] discharge, PRN: as needed for mild pain, 0 Refill(s), Type: Maintenance  ondansetron: ( 4 mg ), Oral, q8 hrs, Instructions: per [facility] discharge, PRN: as needed for nausea/vomiting, 0 Refill(s), Type: Maintenance   Problem list:    All Problems  Alcohol abuse / SNOMED CT 21012292 / Confirmed  Hospitalized RFA  BMI 40.0-44.9, adult / ICD-9-CM V85.41 / Confirmed  Congenital pes planovalgus / SNOMED CT 82147211 / Confirmed  [facility] (Dr Renetta Martin)  Elevated creatine kinase / SNOMED CT 7975418605 /  Confirmed  Fatty liver / SNOMED CT 626699137 / Confirmed  Grief / SNOMED CT 617683028 / Confirmed  HTN (hypertension) / SNOMED CT 9010382280 / Confirmed  Hyperlipemia / SNOMED CT 07825566 / Confirmed  Menarche / SNOMED CT 88595060 / Confirmed  Neuritis of foot / SNOMED CT 269085065 / Confirmed  R foot - [facility] (Dr Renetta Martin)  Obesity / ICD-9-.00 / Confirmed  Pancreatitis / SNOMED CT 148212982 / Confirmed  PCOS (polycystic ovarian syndrome) / SNOMED CT 424384156 / Confirmed  Seasonal allergies / SNOMED CT 461421529 / Confirmed  Inactive: History of abnormal cervical Pap smear / SNOMED CT 9237119614  Resolved: Inpatient stay / SNOMED CT 143246564  @[facility] [place] - Acute kidney injury with alcohol withdrawal  Resolved: Inpatient stay / SNOMED CT 981011032  @[facility] [place] - Suicidal behavior without attempted self-injury  Resolved: Inpatient stay / SNOMED CT 464327857  @[facility] [place] - Alcohol use disorder, severe  Resolved: Inpatient stay / SNOMED CT 565670779  @[facility] [place] - Alcoholic ketoacidosis  Resolved: Inpatient stay / SNOMED CT 450075133  @[facility] [place] - Alcohol induced acute pancreatitis.  Canceled: Cervical dysplasia / SNOMED CT 322173423  Severe with LEEP/cone biopsy.      Histories   Past Medical History:    Active  Neuritis of foot (776232399): Onset in the month of 2/2014 at 33 years  Comments:  2/20/2014 CST 1:59 PM Saray Landa LPN foot - [facility] (Dr Renetta Martin)  Congenital pes planovalgus (12170372): Onset in the month of 2/2014 at 33 years  Comments:  2/20/2014 CST 2:00 PM Saray Landa LPN, [facility] (Dr Renetta Martin)  HTN (hypertension) (8351696431): Onset on 9/12/2013 at 33 years.  PCOS (polycystic ovarian syndrome) (086546810): Onset on 2/1/2007 at 26 years.  Menarche (17814317): Onset in 1995 at 15 years.  Seasonal allergies  (072818909)  Resolved  Inpatient stay (232416666): Onset on 2020 at 39 years.  Resolved on 2020 at 39 years.  Comments:  2020 CST 2:16 PM CST - Sybil Simms  @[facility], [place] - Alcohol induced acute pancreatitis.  Inpatient stay (654923030): Onset on 2020 at 39 years.  Resolved on 2020 at 39 years.  Comments:  2020 CST 12:44 PM RON - Sybil Simms  @[facility], [place] - Alcoholic ketoacidosis  Inpatient stay (074475412): Onset on 2019 at 38 years.  Resolved on 2019 at 38 years.  Comments:  2019 CDT 5:43 AM AUTUMNT - Lisa Garduno  @[facility], [place] - Alcohol use disorder, severe  Inpatient stay (784450576): Onset on 2019 at 38 years.  Resolved on 2019 at 38 years.  Comments:  2019 CDT 7:44 AM AUTUMNT - Lisa Garduno  @[facility], [place] - Suicidal behavior without attempted self-injury  Inpatient stay (688602657): Onset on 2019 at 38 years.  Resolved on 2019 at 38 years.  Comments:  2019 CDT 6:50 AM AUTUMNT - Lisa Garduno  @[facility], [place] - Acute kidney injury with alcohol withdrawal   Family History:    Gout  Father  Diabetes mellitus  Grandmother (M)  Hypertension  Father  Hypothyroidism  Mother ()  Heart attack  Mother (): onset at 53 .  Multiple sclerosis  Mother ()  [place] - Cancer of prostate  Father: onset at 60 .     Procedure history:    Esophagogastroduodenoscopy (SNOMED CT 474098492) performed by Umesh Shea MD on 2020 at 39 Years.  Comments:  3/10/2020 10:20 AM CDT - Faustina German  Indication: Esophageal reflux.  Sedation: MAC.  Impression: Normal nasopharynx and oropharynx.  Small hiatal hernia.  Mucosal changes suspicious for alcoholic gastritis.  Normal duodenum.  Tonsillectomy and adenoidectomy in  at 9 Years.  LEEP (SNOMED CT 57221417).  [place] tooth (SNOMED CT 33021267).   Social History:        Alcohol Assessment: Current             Current                     Comments:                      09/04/2013 - Norma Forrest RN                     1 -3 glasses of wine on some week ends some times      Tobacco Assessment            Past                     Comments:                      04/29/2014 - Saray George LPN                     occasional previous tobacco use      Substance Abuse Assessment            Never      Employment and Education Assessment            Employed, Work/School description: Social Work.      Home and Environment Assessment            Marital status: .  Spouse/Partner name: Damian cMgee.  Lives with Spouse.      Nutrition and Health Assessment            Type of diet: Regular.      Exercise and Physical Activity Assessment                     Comments:                      08/13/2015 - Rina Cain RN                     No regular exercise      Sexual Assessment            Sexually active: Yes.  Sexual orientation: Heterosexual.  Uses condoms: No.

## 2022-02-16 NOTE — PROGRESS NOTES
Patient:   UNA BRIONES            MRN: [id number]            FIN: [id number]               Age:   37 years     Sex:  Female     :  1980   Associated Diagnoses:   Cellulitis of right foot   Author:   Toñito Alexis PA-C      Chief Complaint   2018 9:50 AM CST    Patient presents for foot infection-top of R foot scrapped x 2 weeks      History of Present Illness   Chief complaint and symptoms noted above and confirmed with patient   scraped the top of her right foot on a treadmill 2 weeks, the foot is becoming more painful  has been using neosporin, scant drainage      Review of Systems   Constitutional:  No fever.    Ear/Nose/Mouth/Throat:  Negative.    Respiratory:  Negative.       Health Status   Allergies:    Allergic Reactions (Selected)  Severity Not Documented  Zithromax (Face swelling)   Medications:  (Selected)   Prescriptions  Prescribed  NuvaRing 0.120 mg-0.015 mg/24 hours vaginal rin EA, VAG, q4wk, # 3 EA, 1 Refill(s), Type: Maintenance, Pharmacy: [id number], 1 EA vag q4 wks  lisinopril 20 mg oral tablet: See Instructions, Instructions: TAKE 1 TABLET BY MOUTH DAILY, # 90 tab(s), Type: Soft Stop, Pharmacy: [facility] [id number], TAKE 1 TABLET BY MOUTH DAILY  Documented Medications  Documented  Afrin Nasal Sinus: 2 spray(s), nasal, bid, 0 Refill(s), Type: Maintenance   Problem list:    All Problems (Selected)  Neuritis of foot / SNOMED CT 689398220 / Confirmed  PCOS (Polycystic Ovarian Syndrome) / ICD-9-.4 / Confirmed  Obesity / ICD-9-.00 / Confirmed  Fatty liver / SNOMED CT 475498077 / Confirmed  Menarche / SNOMED CT 88832944 / Confirmed  Congenital pes planovalgus / SNOMED CT 01428947 / Confirmed  HTN (Hypertension) / ICD-9-.9 / Confirmed  Seasonal allergies / SNOMED CT 197882891 / Confirmed  Hyperlipemia / SNOMED CT 99256181 / Confirmed  BMI 40.0-44.9, adult / ICD-9-CM V85.41 / Confirmed      Histories   Past Medical History:    Active  Neuritis  of foot (714908598): Onset in the month of 2014 at 33 years  Comments:  2014 CST 1:59 PM CST - Saray George CMA  R foot - [facility] (Dr Renetta Martin)  Congenital pes planovalgus (86057426): Onset in the month of 2014 at 33 years  Comments:  2014 CST 2:00 PM CST - Saray George CMA, [facility] (Dr Renetta Martin)  PCOS (Polycystic Ovarian Syndrome) (256.4): Onset on 2007 at 26 years.  Menarche (33350263): Onset in  at 15 years.  Seasonal allergies (421045470)   Family History:    Gout  Father  Diabetes mellitus  Grandmother (M)  Hypertension  Father  Hypothyroidism  Mother ()  Heart attack  Mother (): onset at 53 .  Multiple sclerosis  Mother ()  [place] - Cancer of prostate  Father: onset at 60 .     Procedure history:    Tonsillectomy and adenoidectomy in  at 9 Years.  LEEP (61102620).  [place] tooth (03353592).      Physical Examination   Vital Signs   2018 9:50 AM CST Temperature Tympanic 98.0 DegF    Peripheral Pulse Rate 80 bpm    Pulse Site Radial artery    HR Method Manual    Systolic Blood Pressure 140 mmHg  HI    Diastolic Blood Pressure 80 mmHg    Mean Arterial Pressure 100 mmHg    BP Site Right arm    BP Method Manual      Measurements from flowsheet : Measurements   2018 9:50 AM CST Height Measured - Standard 64.5 in    Weight Measured - Standard 248.8 lb    BSA 2.26 m2    Body Mass Index 42.04 kg/m2  HI      General:  No acute distress.    Integumentary:  on top of mid right foot there is a 2 cm ulcerated lesion with scant drainage,  ring of erythema around it, scabbing in the middle,  area is tender to touch, no red streaking,  .       Impression and Plan   Diagnosis     Cellulitis of right foot (SXG19-FF L03.115).     Summary:  will treat with Bactrim DS, continue with neosporin, do epsom salt soaks, follow up if not improving.    Orders     Orders   Pharmacy:  Bactrim  mg-160 mg oral tablet (Prescribe): 1 tab(s), PO, BID, x  10 day(s), # 20 tab(s), 0 Refill(s), Type: Maintenance, Pharmacy: [id number], 1 tab(s) po bid,x10 day(s).     Orders   Charges (Evaluation and Management):  82932 office outpatient visit 15 minutes (Charge) (Order): Quantity: 1, Cellulitis of right foot.

## 2022-02-16 NOTE — TELEPHONE ENCOUNTER
---------------------  From: Sivakumar OBREGON Yumiko JUAN ANTONIO (Phone Messages Pool ([id number]))   To: [facility] Message Pool ([id number]_[facility] [place]);     Sent: 6/25/2019 2:38:43 PM CDT  Subject: BP and Kidney Test        Phone Message    PCP:   LISS      Time of Call:  1413       Person Calling:  Patient  Phone number:  [phone]    Returned call at: Patient presented at clinic at 1430    Note:   Patient is calling to see if [facility] would be able to order a urine test to test her kidney function.  She does not like to have her blood drawn and thought if here was a way to do this with out needing her blood she wanted to try it.  BP today when at clinic after sitting for 5 minutes was 142/90.  Patient states she is off her BP med now after she was hospitalized because they felt it was damaging her kidneys.  Patient states she is fine hearing back on Monday when [facility] back in clinic.      Last office visit and reason:  05/24/2019 hospital f/u---------------------  From: Suma Lockwood ([facility] Message [facility] ([id number]))   To: Brenda Jacobo;     Sent: 6/26/2019 8:49:16 AM CDT  Subject: FW: BP and Kidney Test---------------------  From: Brenda Jacobo   To: [facility] Message Pool ([id number]);     Sent: 6/30/2019 12:26:47 PM CDT  Subject: RE: BP and Kidney Test      please let her know that a urine test will give us the information we need  (the kidney function would have to be more significantly damaged before the urine would detect it.  We want to identify problems early).  She needs a BMP and needs to be recording bp readings (maybe 3-5 times per week) so we can see how her bp is running.3:01pm Called and LM on personalized VM. Let her know a urine test can detect problems with the kidneys but generally not until the damage is severe. Per [facility], it is recommended that she have blood drawn to help with earlier detection. Pt should also monitoring/recording BP readings. Asked that she call back with any other  questions or concerns.

## 2022-02-16 NOTE — LETTER
(Inserted Image. Unable to display)   February 12, 2020      UNA BRIONES  [address] [place], [place] [id number]        Dear UNA,      Thank you for selecting [facility] for your healthcare needs.     Upper GI  IMPRESSION:  1.  Gastroesophageal reflux present in the lower esophagus.  2.  Smooth narrowing of the GE junction that may be related to reflux  esophagitis.  3.  Stomach and proximal small bowel are unremarkable.  4.  Mild esophageal dysmotility likely related to GERD with intraesophageal  reflux present.                Please contact me or my assistant at [phone] if you have any questions or concerns.     Sincerely,        Shaun James MD

## 2022-02-16 NOTE — PROGRESS NOTES
Chief Complaint    review lab results  History of Present Illness      Madelyn is referred because of hyponatremia.  She is obese has hypertension, polycystic ovary syndrome, fatty liver disease, but is otherwise healthy.  Her diet is not sodium restricted.  She has no history of heart liver kidney disease.  She tends to push fluids since a urinary tract infection a couple of years ago at work drinks water on an hourly basis.  Review of Systems      No fever, night sweats, weight loss, pharyngitis, dyspnea, cough, chest pain, edema, abdominal pain or increased girth, joint complaints, rash, weakness, headache, neurologic disease or headache.  Physical Exam   Vitals & Measurements    HR: 88(Peripheral)  BP: 137/100     HT: 64.5 in  WT: 251 lb  BMI: 42.41       Patient is a healthy-appearing obese woman in no distress alert and oriented.  H&T exam is unremarkable.  Neck supple no thyromegaly.  Chest clear to auscultation percussion.  Back nontender.  Cardiac exam is regular no murmur gallop abdomen is obese soft nontender.  No palpable hepatosplenomegaly.  No edema.  She is alert and oriented neurologic exam nonfocal.  Assessment/Plan       BMI 40.0-44.9, adult        Weight loss is encouraged.                Fatty liver         Patient is encouraged to drink no more than 1 serving of alcohol per day she tends to have several per day particularly on weekends and summer.                HTN (Hypertension)         Controlled with lisinopril.         Ordered:          POC URINALYSIS, UA* ([facility]), Specimen Type: Urine, Collection Date: 10/10/17 14:14:00 CDT                Hyponatremia with decreased serum osmolality         Patient with hyponatremia no evidence of hypo-or hyperglycemia or jaundice nor hyperlipemia.  She has normal kidney function and no thiazide diuretic use.  She appears euvolemic.  Will obtain urine sodium and osmolarity today however ultimately likely to need ACTH stim test and serum osmolarity.  Patient  does not like to have blood drawn.  I should add that the recent thyroid testing was normal         Ordered:          Osmolality, Urine* ([facility]), Specimen Type: Urine, Collection Date: 10/10/17 14:10:00 CDT          Sodium with creatinine, random urine* ([facility]), Specimen Type: Urine, Collection Date: 10/10/17 14:10:00 CDT                Orders:         lisinopril, 2 tab(s) ( 20 mg ), po, daily, # 30 tab(s), 0 Refill(s), Type: Maintenance, Pharmacy: [id number]  Patient Information     Name:UNA BRIONES      Address:      [address] [place] [id number]     Sex:Female     YOB: 1980     [phone]     Emergency Contact:ERVIN BRIONES     MRN:[id number]     FIN:[id number]     Location:[facility]     Date of Service:10/10/2017      Primary Care Physician:       Brenda Jacobo, [phone]  Problem List/Past Medical History    Ongoing     BMI 40.0-44.9, adult     Congenital pes planovalgus       Comments: [facility] (Dr Renetta Martin)     Fatty liver     History of abnormal pap smear     HTN (Hypertension)     Hyperlipemia     Menarche     Neuritis of foot       Comments: R foot - [facility] (Dr Renetta Martin)     Obesity     PCOS (Polycystic Ovarian Syndrome)     Seasonal allergies    Historical  Procedure/Surgical History     Tonsillectomy and adenoidectomy (1989)     LEEP     [place] tooth  Medications    Afrin Nasal Sinus, 2 spray(s), nasal, bid    lisinopril 10 mg oral tablet, 20 mg= 2 tab(s), po, daily,   Still taking, not as prescribed: been taking 2    metFORMIN 500 mg oral tablet, 500 mg= 1 tab(s), po, bid    NuvaRing 0.120 mg-0.015 mg/24 hours vaginal ring, 1 EA, vag, q4 wks, 1 refills  Allergies    Zithromax (face swelling)  Social History    Smoking Status - 07/05/2017     Former smoker     Alcohol - Current, 08/20/2015      Current     Employment and Education - 08/20/2015      Employed, Work/School description: Social  Work.     Exercise and Physical Activity - 08/20/2015     Home and Environment - 08/20/2015      Marital status: . Spouse/Partner name: Damian Mcgee. Lives with Spouse.     Nutrition and Health - 08/20/2015      Type of diet: Regular.     Sexual - 08/20/2015      Sexually active: Yes. Sexual orientation: Heterosexual. Uses condoms: No.     Substance Abuse - 08/20/2015      Never     Tobacco - 08/20/2015      Past  Family History    CA - Cancer of prostate: Father (Dx at 60).    Diabetes mellitus: Grandmother (M).    Gout: Father.    Heart attack: Mother (Dx at 53).    Hypertension: Father.    Hypothyroidism: Mother.    Multiple sclerosis: Mother.  Immunizations      Vaccine Date Status      tetanus/diphth/pertuss (Tdap) adult/adol 12/29/2015 Recorded      influenza virus vaccine, inactivated 09/12/2013 Given      tetanus/diphth/pertuss (Tdap) adult/adol 06/01/2006 Recorded      MMR (measles/mumps/rubella) 12/12/1983 Recorded  Lab Results      Results (Last 90 days)                Laboratory                     Chemistry                          General Chemistry                               A/G Ratio:      1.4   (09/28/17 10:20 AM CDT)                                                                                                                                          ALT/SGPT:      H 91 unit/L (Range 6 - 29)  (09/28/17 10:20 AM CDT)                                                                                                                                          AST/SGOT:      H 71 unit/L (Range 10 - 30)  (09/28/17 10:20 AM CDT)                                                                                                                                          Albumin Level:      4.4 gm/dL  (09/28/17 10:20 AM CDT)                                                                                                                                          Alkaline Phosphatase:      49 unit/L  (09/28/17  10:20 AM CDT)                                                                                                                                          BUN:      16 mg/dL  (09/28/17 10:20 AM CDT)                                                                                                                                          BUN/Creat Ratio:      NOT APPLICABLE   (09/28/17 10:20 AM CDT)                                                                                                                                          Basic Metabolic Profile:         (09/28/17 10:20 AM CDT)                                                                                                                                          Bilirubin Total:      1.2 mg/dL  (09/28/17 10:20 AM CDT)                                                                                                                                          CO2 Level:      23 mmol/L  (09/28/17 10:20 AM CDT)                                                                                                                                          Calcium Level:      9.6 mg/dL  (09/28/17 10:20 AM CDT)                                                                                                                                          Chloride Level:      L 94 mmol/L (Range 98 - 110)  (09/28/17 10:20 AM CDT)                                                                                                                                          Creatinine Level:      1.07 mg/dL  (09/28/17 10:20 AM CDT)                                                                                                                                          Globulin:      3.1   (09/28/17 10:20 AM CDT)                                                                                                                                          Glucose Level:      H 101 mg/dL (Range 65 - 99)  (09/28/17  10:20 AM CDT)                                                                                                                                          Hgb A1c                                        (09/28/17 10:20 AM CDT)                                                                                                                                                4.9   (09/28/17 10:20 AM CDT)                                                                                                                                          Potassium Level:      4.9 mmol/L  (09/28/17 10:20 AM CDT)                                                                                                                                          Protein Total:      7.5 gm/dL  (09/28/17 10:20 AM CDT)                                                                                                                                          Sodium Level:      L 129 mmol/L (Range 135 - 146)  (09/28/17 10:20 AM CDT)                                                                                                                                          eGFR:      67 mL/min/1.73m2  (09/28/17 10:20 AM CDT)                                                                                                                                          eGFR African American:      78 mL/min/1.73m2  (09/28/17 10:20 AM CDT)                                                                                                                                     Lipids                               Cholesterol:      H 256 mg/dL (Range  - <200)  (09/28/17 10:20 AM CDT)                                                                                                                                          Cholesterol/HDL Ratio:      3.7   (09/28/17 10:20 AM CDT)                                                                                                                                           HDL:      70 mg/dL  (09/28/17 10:20 AM CDT)                                                                                                                                          LDL:      H 162   (09/28/17 10:20 AM CDT)                                                                                                                                          Lipid Profile:         (09/28/17 10:20 AM CDT)                                                                                                                                          Non-HDL Cholesterol:      H 186  (Range  - <130)  (09/28/17 10:20 AM CDT)                                                                                                                                          Triglyceride:      119 mg/dL  (09/28/17 10:20 AM CDT)                                                                                                                    Diagnostic Results   Review the patient's lab work reveals a sodium of 129 chloride of 94 glucose 101 ALT 91 cholesterol 256 triglyceride 119 on September 28, 2017 July 22, 2016 sodium was 132 chloride 95 ALT 68 cholesterol 309 triglyceride 114 with a TSH of 2.4.  September 4, 2015 CBC normal.  In 2013 and 2015 electrolytes were normal with persistent elevation in transaminases consistent with fatty liver disease and also cholesterol.    A CT scan of the abdomen 2015 revealed findings consistent with fatty liver disease.

## 2022-02-16 NOTE — TELEPHONE ENCOUNTER
---------------------  From: Libby De Leon LPN   To: Media[facility] Pool ([id number]_[facility]);     Sent: 7/8/2019 10:58:41 AM CDT  Subject: BP medication     Pt came in for labs today and was wondering if there was a better Blood Pressure medication that is not as hard on the kidneys.     advised pt that [facility] not in today and pt okay with waiting until she is back for an answer.Patient left message at 0948 regarding this BP med request as well.---------------------  From: Saray George LPN ([facility] Message Pool ([id number]_[facility]))   To: Brenda Jacobo;     Sent: 7/8/2019 3:18:58 PM CDT  Subject: FW: BP medication---------------------  From: Brenda Jacobo   To: Media[employer] Pool ([id number]_[facility]);     Sent: 7/9/2019 8:10:53 AM CDT  Subject: RE: BP medication     please tell Madelyn, I sent rx to [facility] for  a low dose of Losartan, this is an angiotension receptor blocker. It should take the place of the ACE inhibitor she has been on.  Her kidney function has been fine, it is her liver we are most concerned about, as well as blood pressure. Start the medication and follow up in about 2 weeks with me please.Notified pt by phone call- verbalized understanding and agreed with plan.

## 2022-02-16 NOTE — NURSING NOTE
Comprehensive Intake Entered On:  2/3/2020 4:15 PM CST    Performed On:  2/3/2020 4:07 PM CST by Saray George LPN               Summary   Chief Complaint :   1) here is f/u to recent ER/hospital visit for alcoholic ketoacidosis, still having some SOB, fatigue, weakness, suppose to return to work tomorrow 2) heartburn    Advance Directive :   Yes   Menstrual Status :   Menarcheal   Weight Measured :   224.8 lb(Converted to: 224 lb 13 oz, 101.97 kg)    Height Measured :   64.5 in(Converted to: 5 ft 4 in, 163.83 cm)    Body Mass Index :   37.99 kg/m2 (HI)    Body Surface Area :   2.15 m2   Systolic Blood Pressure :   116 mmHg   Diastolic Blood Pressure :   72 mmHg   Mean Arterial Pressure :   87 mmHg   Peripheral Pulse Rate :   108 bpm (HI)    BP Site :   Right arm   BP Method :   Manual   Temperature Tympanic :   97.8 DegF(Converted to: 36.6 DegC)  (LOW)    Oxygen Saturation :   99 %   Saray George LPN - 2/3/2020 4:07 PM CST   Health Status   Allergies Verified? :   Yes   Medication History Verified? :   Yes   Medical History Verified? :   No   Pre-Visit Planning Status :   Not completed   Tobacco Use? :   Former smoker   Saray George LPN - 2/3/2020 4:07 PM CST   Meds / Allergies   (As Of: 2/3/2020 4:15:20 PM CST)   Allergies (Active)   azithromycin  Estimated Onset Date:   Unspecified ; Created By:   Lisa Garduno; Reaction Status:   Active ; Category:   Drug ; Substance:   azithromycin ; Type:   Allergy ; Updated By:   Lisa Garduno; Reviewed Date:   12/11/2019 5:15 PM CST        Medication List   (As Of: 2/3/2020 4:15:20 PM CST)   Prescription/Discharge Order    losartan  :   losartan ; Status:   Prescribed ; Ordered As Mnemonic:   losartan 25 mg oral tablet ; Simple Display Line:   1 tab(s), Oral, daily, 7 tab(s), 0 Refill(s) ; Ordering Provider:   Brenda Jacobo; Catalog Code:   losartan ; Order Dt/Tm:   1/9/2020 8:26:17 AM CST          mupirocin topical  :   mupirocin topical ; Status:    Prescribed ; Ordered As Mnemonic:   Bactroban 2% nasal ointment ; Simple Display Line:   1 stormy, Nasal, BID, for 10 day(s), oint or cream most affordable, 1 gm, 0 Refill(s) ; Ordering Provider:   Brenda Jacobo; Catalog Code:   mupirocin topical ; Order Dt/Tm:   9/24/2019 11:57:17 AM CDT

## 2022-02-16 NOTE — TELEPHONE ENCOUNTER
---------------------  From: Brenda Jacobo   Sent: 6/18/2019 8:15:20 AM CDT  Subject: General Message     I called Madelyn to check in and she tells me she is doing very well. Has not sought out patient treatment but is feeling very supported and healthy.

## 2022-02-16 NOTE — TELEPHONE ENCOUNTER
"---------------------  From: Kaycee Parikh LPN (Phone Messages Pool ([id number]_[facility]))   To: GJ Message Pool ([id number]_[facility]);     Cc: Aguila ROSA, Brenda;      Sent: 2/12/2020 1:05:06 PM CST  Subject: General Message     LISS- VICKI about FMLA paperwork    Phone Message    PCP:   LISS- also asked for GJM      Time of Call:  12:45pm       Person Calling:  pt  Damian  Phone number:  [phone]    Note:   Damian LM stating pt is having really bad heartburn still and has not been able to sleep. He says she \"did drink of course.\" He says she thinks that is going to make the heartburn go away but he says it doesn't- she has heartburn all the time.     Damian says she is in immense pain and wonders if this can be addressed soon or if it is going to be a matter of going to a treatment program.    Damian says John Rodrigues called his morning and says \"she didn't seem real bright to me.\" Damian says he does not think pt wants to go to [facility]- they only have a 3 day bed and pt wants to go for 30 days.    Damian says pt was off alcohol for 3 days in the hospital then came home and drank again- he says she wants something more intense.    Damian found a place in [facility] called [facility]. Insurance will cover it if pt is evaluated by a licensed drug and alcohol evaluator Damian says they have one in [place]. He is wondering if this would be a good idea.    Damian again says pt is in a ton of pain and wonders if she just needs to quit drinking or wait for the results from what was done yesterday to see if pt can get something to be treated before she goes.    Damian also wanting to let [facility] know he is going to be dropping off FMLA paperwork.    Last office visit and reason:  2/11/2020 GERD/Alcoholism with GJM---------------------  From: Donita Goddard CMA ([facility] Message Pool ([id number]_[facility]))   To: Renaldo James MD;     Sent: 2/12/2020 1:12:28 PM CST  Subject: FW: General " Message---------------------  From: Brenda Jacobo   To: Phone Messages Pool ([id number]_[place] [facility] [place]);     Sent: 2/12/2020 1:20:48 PM CST  Subject: RE: General Message     I just left Damian a detailed message after speaking with DR James. Told Damian the upper GI results look good and YES he should pursue getting her evaluated in [place] by a counselor that can get her admitted to the [facility] program.    Would you please try and reach him again later this afternoon to confirm her received the message.  I told him he could drop off [facility] paperwork and I will complete it. ThanksNotedcontacted Damian at 1438 and he said he hadn't gotten the message, but was going to check his phone and c/b if he had any questions.  Reviewed [facility]'s recommendations and has already dropped off the forms    Asked him to reach out to [place] regarding getting her eval---------------------  From: Hortencia Trujillo CMA (Phone Messages Pool ([id number]_[facility]))   To: Referral Coordinators Pool ([facility]24[facility]);     Sent: 2/12/2020 2:42:01 PM CST  Subject: FW: General Message     Is a referral needed for the drug and alcohol evaluator?---------------------  From: Ariane Ames (Referral Coordinators Pool ([id number]))   To: Phone Messages Pool ([id number]);     Sent: 2/12/2020 3:03:14 PM CST  Subject: RE: General Message     Not that I am aware of.---------------------  From: rBenda Jacobo   To: Phone Messages Pool ([id number]);     Sent: 2/12/2020 3:54:23 PM CST  Subject: RE: General Message     I think Damian can initiate that, thanks.

## 2022-02-16 NOTE — NURSING NOTE
Comprehensive Intake Entered On:  12/11/2019 10:06 AM CST    Performed On:  12/11/2019 10:03 AM CST by Stephanie Calhoun CMA               Summary   Chief Complaint :   c/o sore inside of left nostril   Advance Directive :   Yes   Menstrual Status :   Menarcheal   Weight Measured :   241.6 lb(Converted to: 241 lb 10 oz, 109.59 kg)    Height Measured :   64.5 in(Converted to: 5 ft 4 in, 163.83 cm)    Body Mass Index :   40.83 kg/m2 (HI)    Body Surface Area :   2.23 m2   Systolic Blood Pressure :   122 mmHg   Diastolic Blood Pressure :   60 mmHg   Mean Arterial Pressure :   81 mmHg   Peripheral Pulse Rate :   64 bpm   Stephanie Calhoun CMA - 12/11/2019 10:03 AM CST   Consents   Consent for Immunization Exchange :   Consent Granted   Consent for Immunizations to Providers :   Consent Granted   Stephanie Calhoun CMA - 12/11/2019 10:03 AM CST   Meds / Allergies   (As Of: 12/11/2019 10:06:33 AM CST)   Allergies (Active)   azithromycin  Estimated Onset Date:   Unspecified ; Created By:   Lisa Garduno; Reaction Status:   Active ; Category:   Drug ; Substance:   azithromycin ; Type:   Allergy ; Updated By:   Lisa Garduno; Reviewed Date:   5/28/2019 5:36 AM CDT        Medication List   (As Of: 12/11/2019 10:06:33 AM CST)   Prescription/Discharge Order    benzonatate  :   benzonatate ; Status:   Processing ; Ordered As Mnemonic:   Tessalon Perles 100 mg oral capsule ; Ordering Provider:   Luisa Sandoval PA-C; Action Display:   Complete ; Catalog Code:   benzonatate ; Order Dt/Tm:   12/11/2019 10:06:25 AM CST          losartan  :   losartan ; Status:   Prescribed ; Ordered As Mnemonic:   losartan 25 mg oral tablet ; Simple Display Line:   1 tab(s), Oral, daily, 14 tab(s), 0 Refill(s) ; Ordering Provider:   Brenda Jacobo; Catalog Code:   losartan ; Order Dt/Tm:   12/9/2019 3:18:55 PM CST          medroxyPROGESTERone 10 mg oral tablet  :   medroxyPROGESTERone 10 mg oral tablet ; Status:   Prescribed ;  Ordered As Mnemonic:   medroxyPROGESTERone 10 mg oral tablet ; Simple Display Line:   See Instructions, TAKE ONE TABLET BY MOUTH EVERY DAY FOR 10 DAYS EVERY 3 MONTHS., 10 tab(s) ; Ordering Provider:   Brenda Jacobo; Catalog Code:   medroxyPROGESTERone ; Order Dt/Tm:   10/7/2019 2:18:03 PM CDT          mometasone nasal  :   mometasone nasal ; Status:   Prescribed ; Ordered As Mnemonic:   Nasonex 50 mcg/inh nasal spray ; Simple Display Line:   2 spray(s), nasal, bid, 1 EA, 11 Refill(s) ; Ordering Provider:   Luisa Sandoval PA-C; Catalog Code:   mometasone nasal ; Order Dt/Tm:   3/1/2019 4:02:53 PM CST          mupirocin topical  :   mupirocin topical ; Status:   Prescribed ; Ordered As Mnemonic:   Bactroban 2% nasal ointment ; Simple Display Line:   1 stormy, Nasal, BID, for 10 day(s), oint or cream most affordable, 1 gm, 0 Refill(s) ; Ordering Provider:   Brenda Jacobo; Catalog Code:   mupirocin topical ; Order Dt/Tm:   9/24/2019 11:57:17 AM CDT          scopolamine  :   scopolamine ; Status:   Prescribed ; Ordered As Mnemonic:   scopolamine 1.5 mg transdermal film, extended release ; Simple Display Line:   1.5 mg, 1 patch(es), TOP, q72hr, apply 4 hours before event, PRN: for motion sickness, 4 EA, 0 Refill(s) ; Ordering Provider:   Brenda Jacobo; Catalog Code:   scopolamine ; Order Dt/Tm:   7/25/2019 4:48:13 PM CDT

## 2022-02-16 NOTE — NURSING NOTE
Comprehensive Intake Entered On:  2019 8:31 AM CDT    Performed On:  2019 8:16 AM CDT by Hannah Parks               Summary   Chief Complaint :   f/u hospital stay, med check, FLMA paperwork   Advance Directive :   Yes   Menstrual Status :   Menarcheal   Weight Measured :   218.2 lb(Converted to: 218 lb 3 oz, 98.97 kg)    Height Measured :   64.5 in(Converted to: 5 ft 4 in, 163.83 cm)    Body Mass Index :   36.87 kg/m2 (HI)    Body Surface Area :   2.12 m2   Systolic Blood Pressure :   100 mmHg   Diastolic Blood Pressure :   60 mmHg   Mean Arterial Pressure :   73 mmHg   Peripheral Pulse Rate :   90 bpm   BP Site :   Right arm   Pulse Site :   Radial artery   BP Method :   Manual   HR Method :   Manual   Temperature Tympanic :   97.6 DegF(Converted to: 36.4 DegC)  (LOW)    Hannah Pakrs - 2019 8:16 AM CDT   Health Status   Allergies Verified? :   Yes   Medication History Verified? :   Yes   Medical History Verified? :   Yes   Pre-Visit Planning Status :   Completed   Tobacco Use? :   Former smoker   Hannah Parks - 2019 8:16 AM CDT   Demographics   Last Name :   ANALI   Address :   [address] [place] [address]   First Name :   UNA Jenkins Initial :   S   Responsible Party Date of Birth () :   1980 CDT   City :   [place]   State :   [place]   Zip Code :   [address]   Hannah Parks  2019 8:16 AM CDT   Providers Grid   Provider Name :    Dr. Rey Gutierrez        Provider Specialty :    Eye care   Chiropractic care   Dentistry          Hannah Parks - 2019 8:16 AM CDT  Hannah Parks  2019 8:16 AM CDT  Hannah Parks - 2019 8:16 AM CDT       Consents   Consent for Immunization Exchange :   Consent Granted   Consent for Immunizations to Providers :   Consent Granted   Hannah Parks - 2019 8:16 AM CDT   Meds / Allergies   (As Of: 2019 8:31:37 AM CDT)   Allergies (Active)   No Known Medication Allergies  Estimated  Onset Date:   Unspecified ; Created By:   Hannah Parks; Reaction Status:   Active ; Category:   Drug ; Substance:   No Known Medication Allergies ; Type:   Allergy ; Updated By:   Hannah Parks; Reviewed Date:   5/24/2019 8:26 AM CDT        Medication List   (As Of: 5/24/2019 8:31:37 AM CDT)   Prescription/Discharge Order    predniSONE  :   predniSONE ; Status:   Processing ; Ordered As Mnemonic:   predniSONE 20 mg oral tablet ; Ordering Provider:   Luisa Sandoval PA-C; Action Display:   Complete ; Catalog Code:   predniSONE ; Order Dt/Tm:   5/24/2019 8:27:07 AM          mometasone nasal  :   mometasone nasal ; Status:   Prescribed ; Ordered As Mnemonic:   Nasonex 50 mcg/inh nasal spray ; Simple Display Line:   2 spray(s), nasal, bid, 1 EA, 11 Refill(s) ; Ordering Provider:   Luisa Sandoval PA-C; Catalog Code:   mometasone nasal ; Order Dt/Tm:   3/1/2019 4:02:53 PM          benzonatate  :   benzonatate ; Status:   Prescribed ; Ordered As Mnemonic:   Tessalon Perles 100 mg oral capsule ; Simple Display Line:   See Instructions, 1-2  cap(s) PO TID 10 day(s), 30 tab(s), 1 Refill(s) ; Ordering Provider:   Luisa Sandoval PA-C; Catalog Code:   benzonatate ; Order Dt/Tm:   3/1/2019 4:01:18 PM          lisinopril  :   lisinopril ; Status:   Processing ; Ordered As Mnemonic:   lisinopril 20 mg oral tablet ; Ordering Provider:   Brenda Jacobo; Action Display:   Complete ; Catalog Code:   lisinopril ; Order Dt/Tm:   5/24/2019 8:28:11 AM

## 2022-02-16 NOTE — LETTER
(Inserted Image. Unable to display)   June 19, 2019        UNA BRIONES  [address], [place] [id number]        Dear UNA,      Thank you for selecting [facility] (previously [place]) for your healthcare needs.    Our records indicate you are due for the following services:    Follow-up office visit & Non-Fasting Labs    If you had your labs done at another facility or with Direct Access Lab Testing at [facility], please bring in a copy of the results to your next visit, mail a copy, or drop off a copy of your results to your Healthcare Provider.    You are due for lab work and an office visit, please schedule the lab appointment 1 week before the office visit.  This will assure all results are available to discuss with your provider during your visit.    **It is very helpful if you bring your medication bottles to your appointment.  This assures we have all of your current medications, including strength and dosing information, documented accurately in your medical record.    To schedule an appointment or if you have further questions, please contact your primary clinic:   [facility]       [phone]   [facility]       [phone]              [facility]     [phone]      Powered by [employer]    Sincerely,    CASSANDRA Johnston

## 2022-02-16 NOTE — TELEPHONE ENCOUNTER
"---------------------  From: Yaz Talavera (Phone Messages Pool ([id number]))   To: Phone Messages Pool ([id number]);     Sent: 1/8/2020 2:22:24 PM CST  Subject: labs     Phone Message    PCP:   [facility]B                            Time of Call:  132p                                        Person Calling:  Pt  Phone number:  [phone]    Returned call at: 220p; left message for a call back    Note:   Pt called wanting labs done? Not sure if she meant before she comes in for an appt? It was hard to understand the message since she sounded so far away. Left message for a call back to see what she needs done.     Last office visit and reason:  12/11/2019; Nasal infection---------------------  From: Michelle Covington RN (Phone Messages Pool ([id number]))   To: [facility] Message Pool ([id number]);     Sent: 1/9/2020 8:25:58 AM CST  Subject: FW: labs     Phone message    PCP:   NCB    Time of Call:  0809       Person Calling:  Pt  Phone number:  [phone], OK to M    Returned call at: 0819    Note:   Pt LVM stating she has lost her Losartan pill bottle and was wondering about getting a refill. I called her back and offered a 1 week supply of med as she is due for AWV with [facility]. Pt agrees to this.    She is also wanting labs done before she sees [facility]. She would like liver, kidney, DM, HTN, \"all of it\" so she can discuss at appointment. I advised she be fasting for labs.    Please indicate which labs to have patient do.    Last office visit and reason:  12/11/19, nasal infection---------------------  From: Suma Lockwood ([facility] Message Pool (32224[facility]))   To: Brenda Jacobo;     Sent: 1/9/2020 2:16:03 PM CST  Subject: FW: labs---------------------  From: Brenda Jacobo   To: [facility] Message Pool ([id number]_[facility]);     Sent: 1/9/2020 3:51:43 PM CST  Subject: RE: labs     Yes, Madelyn missed appointment this week, she has a hard time getting here  1 week " of meds is good  please have labs fasting if able to include CMP for HTN; A1C and lipids and TSH for screening  Thanksput in RTC order and notified pt by VM.

## 2022-02-16 NOTE — PROGRESS NOTES
Patient:   MADELYN BRIONES            MRN: [id number]            FIN: [id number]               Age:   38 years     Sex:  Female     :  1980   Associated Diagnoses:   Alcohol abuse; Elevated creatine kinase; Grief; Hospital discharge follow-up   Author:   Brenda Jacobo      Visit Information      Date of Service: 2019 08:12 am  Performing Location: [facility]  Encounter#: [id number]      Primary Care Provider (PCP):  Brenda Jacobo    NPI# [id number]      Referring Provider:  Brenda Jacobo# [id number]      Chief Complaint   2019 8:16 AM CDT    f/u hospital stay, med check, FLMA paperwork      History of Present Illness      CC reviewed and confirmed with patient  Madelyn was recently hospitalized for alcohol detox , discharged, resumed ETOH intake, then hospitalized again  and underwent legally mandated hospitalization at [facility] for expressing she wanted to kill herself by drinking too much and was at that time detoxed at [facility] and transfered directly from there to [facility], discharged from there on   SHe is here today in f/u  She has been sober since this most recent admission  She and  have removed all alcohol form the home and plan to abstain  She feels she does NOT need out patient alcohol treatment but she and her , who also admits to too much ETOH use, can quit together.  She would like to talk about seeing a counselor. She has never seen a counselor, has no current thoughts of self harm, but she recognizes the alcohol use has been a poor coping mechanism for dealing with stress (she works as a ) and for dealing with grief of loosing her dad.   During hospitalization, creatinine was elevated and other labs abnormal, she had 'tons' of blood work done and [facility] and that paperwork is being requested.  Addendum:  After pt left, records obtained from [place]  Of note she usually drinks vodka and cherry 7 up, maybe 1-5  drinks per days for last 15 years  she was put on Metoprolol ER 50mg daily for tachycardia  Labs drawn on 5/22 reveal  -hemoglobin of 10.7  -platelets 115  -normal potassium and sodium and chloride  -glucose 86  -creatinine 2.0  -calcium 11.0  -elevate ALT (151) andAST (135)  -normal tsh, b12, free t4.  -lipids were drawn and reviewed           Review of Systems             Health Status   Allergies:    Allergic Reactions (Selected)  No Known Medication Allergies   Medications:  (Selected)   Prescriptions  Prescribed  Nasonex 50 mcg/inh nasal spray: 2 spray(s), nasal, bid, # 1 EA, 11 Refill(s), Type: Maintenance, Pharmacy: [id number], 2 spray(s) Nasal bid  Tessalon Perles 100 mg oral capsule: See Instructions, Instructions: 1-2  cap(s) PO TID 10 day(s), # 30 tab(s), 1 Refill(s), Type: Maintenance, Pharmacy: [id number], 1-2  cap(s) PO TID 10 day(s)   Problem list:    All Problems  Alcohol abuse / SNOMED CT 79075807 / Confirmed  Hospitalized [facility]  BMI 40.0-44.9, adult / ICD-9-CM V85.41 / Confirmed  Congenital pes planovalgus / SNOMED CT 84115485 / Confirmed  [facility] (Dr Renetta Martin)  Fatty liver / SNOMED CT 379834340 / Confirmed  HTN (hypertension) / SNOMED CT 3075162291 / Confirmed  Hyperlipemia / SNOMED CT 29536701 / Confirmed  Menarche / SNOMED CT 53132165 / Confirmed  Neuritis of foot / SNOMED CT 036116214 / Confirmed  R foot - [facility] (Dr Renetta Martin)  Obesity / ICD-9-.00 / Confirmed  PCOS (polycystic ovarian syndrome) / SNOMED CT 501437440 / Confirmed  Seasonal allergies / SNOMED CT 291759059 / Confirmed  Inactive: History of abnormal cervical Pap smear / SNOMED CT 8103962720  Resolved: Inpatient stay / SNOMED CT 206651525  @[facility], [place] - Acute kidney injury with alcohol withdrawal  Resolved: Inpatient stay / SNOMED CT 762005028  @[facility], [place] - Suicidal behavior without attempted self-injury  Canceled: Cervical dysplasia /  SNOMED CT 553258105  Severe with LEEP/cone biopsy.      Histories   Past Medical History:    Active  Neuritis of foot (131089754): Onset in the month of 2014 at 33 years  Comments:  2014 CST 1:59 PM Saray Landa LPN foot - [facility] (Dr Renetta Martin)  Congenital pes planovalgus (05756901): Onset in the month of 2014 at 33 years  Comments:  2014 CST 2:00 PM Saray Landa LPN[facility] (Dr Renetta Martin)  HTN (hypertension) (9435326345): Onset on 2013 at 33 years.  PCOS (polycystic ovarian syndrome) (772031251): Onset on 2007 at 26 years.  Menarche (00653508): Onset in  at 15 years.  Seasonal allergies (812318017)  Resolved  Inpatient stay (263937204): Onset on 2019 at 38 years.  Resolved on 2019 at 38 years.  Comments:  2019 CDT 7:44 AM CDT - Lisa Garduno  @[facility], [place] - Suicidal behavior without attempted self-injury  Inpatient stay (058481010): Onset on 2019 at 38 years.  Resolved on 2019 at 38 years.  Comments:  2019 CDT 6:50 AM Lisa Rivas  @[facility], [place] - Acute kidney injury with alcohol withdrawal   Family History:    Gout  Father  Diabetes mellitus  Grandmother (M)  Hypertension  Father  Hypothyroidism  Mother ()  Heart attack  Mother (): onset at 53 .  Multiple sclerosis  Mother ()  [place] - Cancer of prostate  Father: onset at 60 .     Procedure history:    Tonsillectomy and adenoidectomy in  at 9 Years.  LEEP (SNOMED CT 48624745).  [place] tooth (SNOMED CT 38670790).   Social History:        Alcohol Assessment: Current            Current                     Comments:                      2013 - Norma Forrest RN                     1 -3 glasses of wine on some week ends some times      Tobacco Assessment            Past                     Comments:                      2014 - Saray George LPN  previous tobacco use      Substance Abuse Assessment            Never      Employment and Education Assessment            Employed, Work/School description: Social Work.      Home and Environment Assessment            Marital status: .  Spouse/Partner name: Damian Mcgee.  Lives with Spouse.      Nutrition and Health Assessment            Type of diet: Regular.      Exercise and Physical Activity Assessment                     Comments:                      08/13/2015 - Ant DECKER, Rina                     No regular exercise      Sexual Assessment            Sexually active: Yes.  Sexual orientation: Heterosexual.  Uses condoms: No.      Physical Examination   Vital Signs   5/24/2019 8:16 AM CDT Temperature Tympanic 97.6 DegF  LOW    Peripheral Pulse Rate 90 bpm    Pulse Site Radial artery    HR Method Manual    Systolic Blood Pressure 100 mmHg    Diastolic Blood Pressure 60 mmHg    Mean Arterial Pressure 73 mmHg    BP Site Right arm    BP Method Manual      Measurements from flowsheet : Measurements   5/24/2019 8:16 AM CDT Height Measured - Standard 64.5 in    Weight Measured - Standard 218.2 lb    BSA 2.12 m2    Body Mass Index 36.87 kg/m2  HI      General:  Alert and oriented, No acute distress.    Eye:  Extraocular movements are intact, Normal conjunctiva.    HENT:  No pharyngeal erythema.    Neck:  Supple, Non-tender, No thyromegaly.    Respiratory:  Lungs are clear to auscultation, Respirations are non-labored.    Cardiovascular:  Normal rate, Regular rhythm, No murmur, Normal peripheral perfusion, No edema.    Musculoskeletal:  Normal range of motion, Normal gait.    Integumentary:  Warm, Dry, Pink.    Neurologic:  Normal sensory, Normal motor function, No focal deficits.    Psychiatric:  Cooperative, Appropriate mood & affect, Normal judgment, Non-suicidal.       Impression and Plan   Diagnosis     Alcohol abuse (VYD61-EQ F10.10).     Elevated creatine kinase (HNJ49-UZ R74.8).     Grief  (ARL39-CO F43.21).     Hospital discharge follow-up (OVO34-TG Z09).     Patient Instructions:       Counseled: Patient, Regarding diagnosis, Regarding treatment, Regarding medications, Verbalized understanding, will repeat abnormal labs in 1 week and see me few days later--needs CBC with diff, CMP,serum ferritin, hpylori  given counseling into/contacts and advised drug treatment out patient support as well, given [facility] contact  bp is low, will hold off on ace or beta blocker at this time and recheck in 2 weeks, 40 min in face to face and coord of care.

## 2022-02-16 NOTE — NURSING NOTE
Comprehensive Intake Entered On:  2/11/2020 2:21 PM CST    Performed On:  2/11/2020 2:17 PM CST by Cheryl Larios CMA               Summary   Chief Complaint :   EGD consult per [facility]   Advance Directive :   Yes   Menstrual Status :   Menarcheal   Weight Measured :   219 lb(Converted to: 219 lb 0 oz, 99.34 kg)    Height/Length Estimated :   64.5    Systolic Blood Pressure :   137 mmHg (HI)    Diastolic Blood Pressure :   90 mmHg (HI)    Mean Arterial Pressure :   106 mmHg   Peripheral Pulse Rate :   103 bpm (HI)    BP Site :   Right arm   BP Method :   Electronic   Temperature Tympanic :   97.9 DegF(Converted to: 36.6 DegC)    Oxygen Saturation :   98 %   Cheryl Larios CMA - 2/11/2020 2:17 PM CST   Health Status   Allergies Verified? :   Yes   Medication History Verified? :   Yes   Medical History Verified? :   Yes   Pre-Visit Planning Status :   Completed   Tobacco Use? :   Former smoker   Cheryl Larios CMA - 2/11/2020 2:17 PM CST   Consents   Consent for Immunization Exchange :   Consent Granted   Consent for Immunizations to Providers :   Consent Granted   Cheryl Larios CMA - 2/11/2020 2:17 PM CST   Meds / Allergies   (As Of: 2/11/2020 2:21:36 PM CST)   Allergies (Active)   azithromycin  Estimated Onset Date:   Unspecified ; Created By:   Lisa Garduno; Reaction Status:   Active ; Category:   Drug ; Substance:   azithromycin ; Type:   Allergy ; Updated By:   Lisa Garduno; Reviewed Date:   2/11/2020 2:19 PM CST        Medication List   (As Of: 2/11/2020 2:21:36 PM CST)   Prescription/Discharge Order    esomeprazole  :   esomeprazole ; Status:   Prescribed ; Ordered As Mnemonic:   NexIUM 20 mg oral delayed release capsule ; Simple Display Line:   20 mg, 1 cap(s), Oral, bid, 90 cap(s), 0 Refill(s) ; Ordering Provider:   Brenda Jacobo; Catalog Code:   esomeprazole ; Order Dt/Tm:   2/3/2020 4:40:52 PM CST          famotidine  :   famotidine ; Status:   Discontinued ; Ordered As Mnemonic:   famotidine 20 mg oral  tablet ; Simple Display Line:   20 mg, 1 tab(s), Oral, bid, 60 tab(s), 0 Refill(s) ; Ordering Provider:   Brenda Jacobo; Catalog Code:   famotidine ; Order Dt/Tm:   2/3/2020 4:42:02 PM CST          sucralfate  :   sucralfate ; Status:   Prescribed ; Ordered As Mnemonic:   Carafate 1 g oral tablet ; Simple Display Line:   1 gm, 1 tab(s), Oral, qid, for 30 day(s), 120 tab(s), 0 Refill(s) ; Ordering Provider:   Brenda Jacobo; Catalog Code:   sucralfate ; Order Dt/Tm:   2/3/2020 4:39:38 PM CST

## 2022-02-16 NOTE — TELEPHONE ENCOUNTER
---------------------  From: Samson/Hannah ANNA (Phone Messages Pool ([id number]_[facility]))   To: [facility] Message Pool ([id number]_[facility]);     Sent: 2/24/2020 8:27:08 AM CST  Subject: Pain      Phone Message    PCP: LISS    Time of Call: 0754    Phone Number: [phone]    Returned call at: 0821    Note: Patient called stating she is still in extreme pain. she saw [facility] on 2/19/20 and was given pain meds. She took her last one this morning.    Called patient at 0821. Patient stated she seen [facility] 2/19/20 for pancreatitis and was given Oxycodone. Patient states she has an appointment with [facility] 3/4/20 and she is wondering if she can get more pain pills until her visit. Patient states that the pain in pretty bad during the night. Please Advise.    Patient notified that [facility] is in at 2 to address.     Pharmacy: _    Last office visit and reason: _    Transferred to: _---------------------  From: Saray George LPN ([facility] Message Pool ([id number]_[facility]))   To: Brenda Jacobo;     Sent: 2/24/2020 1:50:34 PM CST  Subject: FW: Pain---------------------  From: Brenda Jacobo   To: [facility] Message Pool ([id number]_[facility]);     Sent: 2/24/2020 4:08:35 PM CST  Subject: RE: Pain      please tell her I did send a refill for 12 more oxycodone, she needs to start reducing to 1 in the evening rather than 2. Thanks  Frankie checked, no worrisome behavior1756 Spoke with patient, she was given information in [facility]'s message. She agrees to trying to reduce her dose.

## 2022-02-16 NOTE — PROGRESS NOTES
Patient:   UNA BRIONES            MRN: [id number]            FIN: [id number]               Age:   39 years     Sex:  Female     :  1980   Associated Diagnoses:   Abdominal pain; Alcoholic ketoacidosis; Fatigue; Hospital discharge follow-up; HTN (hypertension)   Author:   Brenda Jacobo      Visit Information      Date of Service: 2020 04:03 pm  Performing Location: [facility]  Encounter#: [id number]      Primary Care Provider (PCP):  Brenda Jacobo    NPI# [id number]      Referring Provider:  Brenda Jacobo NPI# [id number]      Chief Complaint   2/3/2020 4:07 PM CST     1) here is f/u to recent ER/hospital visit for alcoholic ketoacidosis, still having some SOB, fatigue, weakness, suppose to return to work tomorrow 2) heartburn      History of Present Illness   here in f/u to hospitalization 1 week ago at [facility] for alcoholic ketoacidosis  Her  Damian brought her here today, he is in the lobby  she still feels terrible, not sure how she will go to work tomorrow, she is a , 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 Denies PPI use, denies hx of ulcers  denies blood in stools or vomiting since she returned home  SHe did have 2 shots of [place] with a dear friend who came in since her discharge for Damian's mom's  over the weekend, denies any other alcohol intake since discharge  She has never sought counseling or alcohol treatment but is willing to consider      Health Status   Allergies:    Allergic Reactions (Selected)  Severity Not Documented  Azithromycin (No reactions were documented)   Medications:  (Selected)   Prescriptions  Prescribed  Bactroban 2% nasal ointment: 1 stormy, Nasal, BID, Instructions: oint or cream most affordable, # 1 gm, 0 Refill(s), Type: Maintenance, Pharmacy: [facility] #[id number], 1 stormy  Nasal bid,x10 day(s),Instr:oint or cream most affordable  Carafate 1 g oral tablet: = 1 tab(s) ( 1 gm ), Oral, qid, # 120 tab(s), 0 Refill(s), Type: Maintenance, Pharmacy: [facility] #[id number], 1 tab(s) Oral qid,x30 day(s)  NexIUM 20 mg oral delayed release capsule: = 1 cap(s) ( 20 mg ), Oral, daily, # 90 cap(s), 0 Refill(s), Type: Maintenance, Pharmacy: [facility] #[id number], 1 cap(s) Oral daily  famotidine 20 mg oral tablet: = 1 tab(s) ( 20 mg ), Oral, bid, # 60 tab(s), 0 Refill(s), Type: Maintenance, patient has supply at home (Rx)  losartan 25 mg oral tablet: = 1 tab(s), Oral, daily, # 7 tab(s), 0 Refill(s), Type: Maintenance, Pharmacy: [facility] #[id number], 1 tab(s) Oral daily   Problem list:    All Problems  Alcohol abuse / SNOMED CT 19112784 / Confirmed  Hospitalized [facility]  BMI 40.0-44.9, adult / ICD-9-CM V85.41 / Confirmed  Congenital pes planovalgus / SNOMED CT 03286442 / Confirmed  [facility] (Dr Renetta Martin)  Elevated creatine kinase / SNOMED CT 7970952930 / Confirmed  Fatty liver / SNOMED CT 874227016 / Confirmed  Grief / SNOMED CT 133501787 / Confirmed  HTN (hypertension) / SNOMED CT 4430550814 / Confirmed  Hyperlipemia / SNOMED CT 32580521 / Confirmed  Menarche / SNOMED CT 68316681 / Confirmed  Neuritis of foot / SNOMED CT 834940991 / Confirmed  R foot - [facility] (Dr Renetta Martin)  Obesity / ICD-9-.00 / Confirmed  PCOS (polycystic ovarian syndrome) / SNOMED CT 327502126 / Confirmed  Seasonal allergies / SNOMED CT 261633709 / Confirmed  Inactive: History of abnormal cervical Pap smear / SNOMED CT 8985346381  Resolved: Inpatient stay / SNOMED CT 239763361  @[facility] [place] [place] - Acute kidney injury with alcohol withdrawal  Resolved: Inpatient stay / SNOMED CT 123228999  @[facility] [place] - Suicidal behavior without attempted self-injury  Resolved: Inpatient stay / SNOMED CT 349383155  @[facility], [place] - Alcohol use disorder,  severe  Canceled: Cervical dysplasia / SNOMED CT 791445419  Severe with LEEP/cone biopsy.      Histories   Past Medical History:    Active  Neuritis of foot (056736833): Onset in the month of 2014 at 33 years  Comments:  2014 CST 1:59 PM CST - Saray George LPN foot - [facility] (Dr Renetta Martin)  Congenital pes planovalgus (77483979): Onset in the month of 2014 at 33 years  Comments:  2014 CST 2:00 PM CST - Saray George LPN [facility] (Dr Renetta Martin)  HTN (hypertension) (6584712285): Onset on 2013 at 33 years.  PCOS (polycystic ovarian syndrome) (466220588): Onset on 2007 at 26 years.  Menarche (43753127): Onset in  at 15 years.  Seasonal allergies (663796463)  Resolved  Inpatient stay (641035722): Onset on 2019 at 38 years.  Resolved on 2019 at 38 years.  Comments:  2019 CDT 5:43 AM CDT - Lisa Garduno  @[facility] [place], [place] - Alcohol use disorder, severe  Inpatient stay (866176942): Onset on 2019 at 38 years.  Resolved on 2019 at 38 years.  Comments:  2019 CDT 7:44 AM CDT - Lisa Garduno  @[facility], [place] - Suicidal behavior without attempted self-injury  Inpatient stay (791730037): Onset on 2019 at 38 years.  Resolved on 2019 at 38 years.  Comments:  2019 CDT 6:50 AM CDT - Lisa Garduno  @[facility], [place] - Acute kidney injury with alcohol withdrawal   Family History:    Gout  Father  Diabetes mellitus  Grandmother (M)  Hypertension  Father  Hypothyroidism  Mother ()  Heart attack  Mother (): onset at 53 .  Multiple sclerosis  Mother ()  [place] - Cancer of prostate  Father: onset at 60 .     Procedure history:    Tonsillectomy and adenoidectomy in  at 9 Years.  LEEP (SNOMED CT 60460915).  Topaz tooth (SNOMED CT 65127808).   Social History:        Alcohol Assessment: Current            Current                     Comments:                       09/04/2013 - Norma Forrest RN                     1 -3 glasses of wine on some week ends some times      Tobacco Assessment            Past                     Comments:                      04/29/2014 - Saray George LPN                     occasional previous tobacco use      Substance Abuse Assessment            Never      Employment and Education Assessment            Employed, Work/School description: Social Work.      Home and Environment Assessment            Marital status: .  Spouse/Partner name: Damian Mcgee.  Lives with Spouse.      Nutrition and Health Assessment            Type of diet: Regular.      Exercise and Physical Activity Assessment                     Comments:                      08/13/2015 - Rina Cain RN                     No regular exercise      Sexual Assessment            Sexually active: Yes.  Sexual orientation: Heterosexual.  Uses condoms: No.        Physical Examination   Vital Signs   2/3/2020 4:07 PM CST Temperature Tympanic 97.8 DegF  LOW    Peripheral Pulse Rate 108 bpm  HI    Systolic Blood Pressure 116 mmHg    Diastolic Blood Pressure 72 mmHg    Mean Arterial Pressure 87 mmHg    BP Site Right arm    BP Method Manual    Oxygen Saturation 99 %      Measurements from flowsheet : Measurements   2/3/2020 4:07 PM CST Height Measured - Standard 64.5 in    Weight Measured - Standard 224.8 lb    BSA 2.15 m2    Body Mass Index 37.99 kg/m2  HI      General:  Alert and oriented, No acute distress.    Eye:  Normal conjunctiva.    HENT:  Tympanic membranes are clear, Normal hearing, Oral mucosa is moist, No pharyngeal erythema.    Neck:  Supple, Non-tender, No lymphadenopathy.    Respiratory:  Lungs are clear to auscultation, Respirations are non-labored, Breath sounds are equal, Symmetrical chest wall expansion.    Cardiovascular:  Normal rate, Regular rhythm, No murmur.    Gastrointestinal:  Soft, Non-distended, Normal bowel sounds, No organomegaly, mild  epigastric tenderness.    Musculoskeletal:  Normal range of motion, Normal gait.    Integumentary:  Warm, Dry, Pink, No rash.    Neurologic:  Alert, Oriented.    Psychiatric:  Cooperative.       Review / Management   Documentation reviewed:  discharge records reviewed and meds reconciled  she will stay off Losartan.       Impression and Plan   Diagnosis     Abdominal pain (QOE53-LC R10.9).     Alcoholic ketoacidosis (NDI62-ZB E87.2).     Fatigue (VJI95-JF R53.83).     Hospital discharge follow-up (SOY53-KK Z09).     HTN (hypertension) (INO92-VC I10).     Plan:  I supported her for making it to clinic appt, 30 min in counseling and coor of care then as leaving she refused to go down to lab, stated she would come back  Worrisome for relapse, I told her to stay home from work this week and connect with Programs for [facility] and find a counselor and she was willing to consider this  see me in 1 week if not going well, 2 weeks if going well.    If heartburn persists, would need EGD, I discussed this with her, she is at great risk for ulcer.    Patient Instructions:       Counseled: Patient, Regarding diagnosis, Regarding treatment, Regarding medications, Verbalized understanding.    Orders     Orders (Selected)   Outpatient Orders  Ordered  H Pylori Breath Test (Request): Abdominal pain  Ordered (Dispatched)  CBC (h/h, RBC, indices, WBC, Plt)* ([facility]): Specimen Type: Blood, Collection Date: 02/03/20 16:45:00 CST  Comprehensive Metabolic Panel* ([facility]): Specimen Type: Serum, Collection Date: 02/03/20 16:45:00 CST  T3, free* ([facility]): Specimen Type: Serum, Collection Date: 02/03/20 16:45:00 CST  T4, free* ([facility]): Specimen Type: Serum, Collection Date: 02/03/20 16:45:00 CST  TSH* ([facility]): Specimen Type: Serum, Collection Date: 02/03/20 16:45:00 CST  Thyroid peroxidase antibodies* ([facility]): Specimen Type: Serum, Collection Date: 02/03/20 16:45:00 CST.         left without completing labs.

## 2022-02-16 NOTE — NURSING NOTE
IV Placement    Vitals: See intake    Placed IV in right posterior hand per [facility] for diagnosis of acute illness/dizziness/dehydration. Attempted placement x 1. 22 G needle.  Patient tolerated well. Saline lock placed on IV catheter and flushed with 10 mL NS.    No s/s of phlebitis or infiltration. Patient denies pain. Patient is going to radiology and will likely have fluids administered after return to clinic room.Present IV flushed with 10 mL NS easily. 1000 cc NS started in patent IV per [facility]. No s/s of infiltration. Patient tolerated well.IV flushed and started at 1640.

## 2022-02-16 NOTE — NURSING NOTE
Comprehensive Intake Entered On:  2/19/2020 2:26 PM CST    Performed On:  2/19/2020 2:21 PM CST by Saray George LPN               Summary   Chief Complaint :   here in f/u of recent hosptial stay for pancreatitis   Advance Directive :   Yes   Menstrual Status :   Menarcheal   Weight Measured :   223.2 lb(Converted to: 223 lb 3 oz, 101.24 kg)    Height Measured :   64.5 in(Converted to: 5 ft 4 in, 163.83 cm)    Body Mass Index :   37.72 kg/m2 (HI)    Body Surface Area :   2.14 m2   Height/Length Estimated :   64.5    Systolic Blood Pressure :   128 mmHg   Diastolic Blood Pressure :   96 mmHg (HI)    Mean Arterial Pressure :   107 mmHg   Peripheral Pulse Rate :   100 bpm   BP Site :   Right arm   BP Method :   Manual   Temperature Tympanic :   98.5 DegF(Converted to: 36.9 DegC)    Oxygen Saturation :   98 %   Saray George LPN - 2/19/2020 2:21 PM CST   Health Status   Allergies Verified? :   Yes   Medication History Verified? :   Yes   Medical History Verified? :   No   Pre-Visit Planning Status :   Not completed   Tobacco Use? :   Former smoker   Saray George LPN - 2/19/2020 2:21 PM CST   Meds / Allergies   (As Of: 2/19/2020 2:26:02 PM CST)   Allergies (Active)   azithromycin  Estimated Onset Date:   Unspecified ; Created By:   Lisa Garduno; Reaction Status:   Active ; Category:   Drug ; Substance:   azithromycin ; Type:   Allergy ; Updated By:   Lisa Garduno; Reviewed Date:   2/11/2020 2:19 PM CST        Medication List   (As Of: 2/19/2020 2:26:02 PM CST)   Prescription/Discharge Order    esomeprazole  :   esomeprazole ; Status:   Prescribed ; Ordered As Mnemonic:   NexIUM 20 mg oral delayed release capsule ; Simple Display Line:   20 mg, 1 cap(s), Oral, bid, 90 cap(s), 0 Refill(s) ; Ordering Provider:   Brenda Jacobo; Catalog Code:   esomeprazole ; Order Dt/Tm:   2/3/2020 4:40:52 PM CST          sucralfate  :   sucralfate ; Status:   Prescribed ; Ordered As Mnemonic:   Carafate 1 g oral  tablet ; Simple Display Line:   1 gm, 1 tab(s), Oral, qid, for 30 day(s), 120 tab(s), 0 Refill(s) ; Ordering Provider:   Brenda Jacobo; Catalog Code:   sucralfate ; Order Dt/Tm:   2/3/2020 4:39:38 PM CST

## 2022-02-16 NOTE — NURSING NOTE
Comprehensive Intake Entered On:  3/1/2019 3:46 PM CST    Performed On:  3/1/2019 3:40 PM CST by Donita Goddard CMA               Summary   Chief Complaint :   c/o nasal congestion, cough at night, sore throat, ear pain for the past couple of days   Advance Directive :   Yes   Menstrual Status :   Menarcheal   Weight Measured :   230 lb(Converted to: 230 lb 0 oz, 104.33 kg)    Height Measured :   64.5 in(Converted to: 5 ft 4 in, 163.83 cm)    Body Mass Index :   38.87 kg/m2 (HI)    Body Surface Area :   2.18 m2   Systolic Blood Pressure :   124 mmHg   Diastolic Blood Pressure :   80 mmHg   Mean Arterial Pressure :   95 mmHg   Peripheral Pulse Rate :   76 bpm   Temperature Tympanic :   98.2 DegF(Converted to: 36.8 DegC)    Donita Goddard CMA - 3/1/2019 3:40 PM CST   Health Status   Allergies Verified? :   Yes   Medication History Verified? :   Yes   Medical History Verified? :   No   Pre-Visit Planning Status :   Not completed   Tobacco Use? :   Former smoker   Donita Goddard CMA - 3/1/2019 3:40 PM CST   Meds / Allergies   (As Of: 3/1/2019 3:46:06 PM CST)   Allergies (Active)   Zithromax  Estimated Onset Date:   Unspecified ; Reactions:   face swelling ; Created By:   Nabila Darnell CMA; Reaction Status:   Active ; Category:   Drug ; Substance:   Zithromax ; Type:   Allergy ; Updated By:   Nabila Darnell CMA; Source:   Paper Chart ; Reviewed Date:   3/1/2019 3:43 PM CST        Medication List   (As Of: 3/1/2019 3:46:06 PM CST)   Prescription/Discharge Order    cephalexin  :   cephalexin ; Status:   Processing ; Ordered As Mnemonic:   cephalexin 500 mg oral capsule ; Ordering Provider:   Brenda Jacobo; Action Display:   Complete ; Catalog Code:   cephalexin ; Order Dt/Tm:   3/1/2019 3:44:09 PM          lisinopril  :   lisinopril ; Status:   Prescribed ; Ordered As Mnemonic:   lisinopril 20 mg oral tablet ; Simple Display Line:   20 mg, 1 tab(s), po, daily, 90 tab(s), 1 Refill(s) ; Ordering Provider:   Aguila ROSA  Brenda; Catalog Code:   lisinopril ; Order Dt/Tm:   9/26/2018 12:59:06 PM          medroxyPROGESTERone  :   medroxyPROGESTERone ; Status:   Processing ; Ordered As Mnemonic:   Provera 10 mg oral tablet ; Ordering Provider:   Brenda Jacobo; Action Display:   Complete ; Catalog Code:   medroxyPROGESTERone ; Order Dt/Tm:   3/1/2019 3:43:43 PM          mupirocin topical  :   mupirocin topical ; Status:   Processing ; Ordered As Mnemonic:   Bactroban 2% nasal ointment ; Ordering Provider:   Brenda Jacobo; Action Display:   Complete ; Catalog Code:   mupirocin topical ; Order Dt/Tm:   3/1/2019 3:43:55 PM            Home Meds    oxymetazoline nasal  :   oxymetazoline nasal ; Status:   Documented ; Ordered As Mnemonic:   Afrin Nasal Sinus ; Simple Display Line:   2 spray(s), nasal, bid, 0 Refill(s) ; Catalog Code:   oxymetazoline nasal ; Order Dt/Tm:   4/25/2016 1:14:31 PM

## 2022-02-16 NOTE — TELEPHONE ENCOUNTER
---------------------  From: Donita Goddard CMA (Phone Messages Pool ([id number]_[facility]))   To: [facility] Message Pool ([id number]_[facility]);     Sent: 5/28/2019 10:40:38 AM CDT  Subject: General Message     Phone message    PCP: LISS     Person calling: Janice RN nurse from [facility] [employer]  Phone number: [phone] ok LVM  Time message left: 0918  Return call time: _    Reason: Janice called and left a message stating that Madelyn was referred to her after her recent hospitalizations on May 14th and inpatient mental health stay and that she has been trying to contact Madelyn with no success.  Janice is calling to follow up to make sure that Madelyn has been in touch either by phone or office visit with PCP after hosp stay. Janice stated that she works with patients by providing support for them along with providing education and resources. She stated that if [facility] or pt needs to contact her to call the number listed above and ok to leave a detail message as it is a private direct line.    LOV: 5/24/19 f/u hosp with [facility]    Transferred to: [facility]  RICKY Davis for Janice that pt saw [facility] on 5/24 and will be coming back in soon for f/u and given counseling info/programs for change info, and if anything else is needed, [facility] will be back next week.---------------------  From: Suma Lockwood ([facility] Message Pool ([id number]_[facility]))   To: Brenda Jacobo;     Sent: 5/28/2019 1:54:03 PM CDT  Subject: FW: General Message     VICKI

## 2022-02-16 NOTE — TELEPHONE ENCOUNTER
Entered by Saranya Gutierrez CMA on January 02, 2020 2:31:19 PM CST  ---------------------  From: Saranya Gutierrez CMA   To: [facility] #[id number]    Sent: 1/2/2020 2:31:19 PM CST  Subject: Medication Management     ** Submitted: **  Order:losartan (losartan 25 mg oral tablet)  1 tab(s)  Oral  daily  Qty:  30 tab(s)        Days Supply:  30        Refills:  0          Substitutions Allowed     Route To [facility] #[id number]    Signed by Saranya Gutierrez CMA  1/2/2020 2:31:00 PM    ** Submitted: **  Complete:losartan (losartan 25 mg oral tablet)   Signed by Saranya Gutierrez CMA  1/2/2020 2:31:00 PM    ** Not Approved:  **  losartan (LOSARTAN 25MG TABLETS)  TAKE ONE TABLET BY MOUTH EVERY DAY  Qty:  30 tab(s)        Days Supply:  30        Refills:  0          Substitutions Allowed     Route To [facility] #[id number]   Signed by Saranya Gutierrez CMA            ------------------------------------------  From: [facility] #[id number]  To: Brenda Jacobo  Sent: January 2, 2020 8:08:03 AM CST  Subject: Medication Management  Due: January 3, 2020 8:08:03 AM CST    ** On Hold Pending Signature **  Drug: losartan (losartan 25 mg oral tablet)  TAKE ONE TABLET BY MOUTH EVERY DAY  Quantity: 30 tab(s)  Days Supply: 30  Refills: 0  Substitutions Allowed  Notes from Pharmacy:     Dispensed Drug: losartan (losartan 25 mg oral tablet)  TAKE ONE TABLET BY MOUTH EVERY DAY  Quantity: 30 tab(s)  Days Supply: 30  Refills: 0  Substitutions Allowed  Notes from Pharmacy:   ------------------------------------------Med Refill      Date of last office visit and reason:  12/11/19; nasal infection      Date of last Med Check / Px:   9/26/18; HTN  Date of last labs pertaining to med:  7/8/19    RTC order in chart:  yes; due now    For Protocol refill, has patient been contacted:  pt has appt scheduled this month

## 2022-02-16 NOTE — CARE COORDINATION
Pt appears on [facility] chronic disease panel as out of parameters for HTN/Tobacco  RTC med check 6/2018, RTC AWV with fasting labs 12/2018, pt is former smoker.

## 2022-02-16 NOTE — TELEPHONE ENCOUNTER
---------------------  From: Cheryl Larios CMA   To: Brenda Jacobo;     Sent: 3/30/2020 11:02:16 AM CDT  Subject: VICKI     Received a call from Damian @ 3172 stating patient passed away on Thursday following a bad fall resulting in a subdural hematoma. It was so bad, they were unable to operate. He did not request a call back.

## 2022-02-16 NOTE — TELEPHONE ENCOUNTER
Entered by Isamar Bryant CMA on March 18, 2020 3:07:41 PM CDT  ---------------------  From: Isamar Bryant CMA   To: [facility] #[id number]    Sent: 3/18/2020 3:07:41 PM CDT  Subject: Medication Management     ** Not Approved: RESPONDED BY OTHER MEANS **  pantoprazole (PANTOPRAZOLE 20MG TABLETS)  TAKE 1 TABLET BY MOUTH TWICE DAILY  Qty:  180 tab(s)        Days Supply:  30        Refills:  0          Substitutions Allowed     Route To Pharmacy - [facility] #[id number]   Note from Pharmacy:  **Patient requests 90 days supply**  Signed by Isamar Bryant CMA            ------------------------------------------  From: [facility] #[id number]  To: Brenda Jacobo  Sent: March 18, 2020 1:56:15 PM CDT  Subject: Medication Management  Due: March 19, 2020 1:56:15 PM CDT    ** On Hold Pending Signature **  Drug: pantoprazole (pantoprazole 20 mg oral delayed release tablet)  TAKE 1 TABLET BY MOUTH TWICE DAILY  Quantity: 180 tab(s)  Days Supply: 30  Refills: 0  Substitutions Allowed  Notes from Pharmacy: **Patient requests 90 days supply**    Dispensed Drug: pantoprazole (pantoprazole 20 mg oral delayed release tablet)  TAKE 1 TABLET BY MOUTH TWICE DAILY  Quantity: 180 tab(s)  Days Supply: 30  Refills: 0  Substitutions Allowed  Notes from Pharmacy: **Patient requests 90 days supply**  ------------------------------------------

## 2022-02-16 NOTE — PROGRESS NOTES
Patient:   UNA BRIONES            MRN: [id number]            FIN: [id number]               Age:   37 years     Sex:  Female     :  1980   Associated Diagnoses:   Laceration of buttock   Author:   Luciano Rothman MD      Visit Information      Date of Service: 2018 02:16 pm  Performing Location: [facility]  Encounter#: [id number]      Primary Care Provider (PCP):  Brenda Jacobo    NPI# [id number]      Referring Provider:  Luciano Rothman MD    NPI# [id number]      Chief Complaint   2018 2:21 PM CDT    Stitch removal        Subjective   Chief complaint 2018 2:21 PM CDT    Stitch removal  .     no pain or drainage      Health Status   Allergies:    Allergic Reactions (Selected)  Severity Not Documented  Zithromax (Face swelling)   Medications:  (Selected)   Prescriptions  Prescribed  lisinopril 20 mg oral tablet: 1 tab(s) ( 20 mg ), PO, Daily, # 30 tab(s), 0 Refill(s), Type: Maintenance, Pharmacy: [id number], 1 tab(s) po daily  lisinopril 20 mg oral tablet: 1 tab(s) ( 20 mg ), po, daily, # 90 tab(s), 0 Refill(s), Type: Maintenance, Pharmacy: [id number], 1 tab(s) po daily  Documented Medications  Documented  Afrin Nasal Sinus: 2 spray(s), nasal, bid, 0 Refill(s), Type: Maintenance   Problem list:    All Problems (Selected)  PCOS (Polycystic Ovarian Syndrome) / 256.4 / Confirmed  Hyperlipemia / 91795390 / Confirmed  Obesity / 278.00 / Confirmed  BMI 40.0-44.9, adult / V85.41 / Confirmed  HTN (Hypertension) / 401.9 / Confirmed  Neuritis of foot / 827330575 / Confirmed  R foot - [facility] (Dr Renetta Martin)  Congenital pes planovalgus / 59516410 / Confirmed  [facility] (Dr Renetta Martin)  Menarche / 91587592 / Confirmed  Seasonal allergies / 155543114 / Confirmed  Fatty liver / 706313202 / Confirmed      Objective   Vital Signs   2018 2:21 PM CDT Peripheral Pulse Rate 64 bpm    Systolic Blood Pressure 124 mmHg    Diastolic Blood  Pressure 72 mmHg    Mean Arterial Pressure 89 mmHg      Measurements from flowsheet : Measurements   6/14/2018 2:21 PM CDT Height Measured - Standard 64.5 in    Weight Measured - Standard 242.0 lb    BSA 2.23 m2    Body Mass Index 40.89 kg/m2  HI      2 lacerations left buttock,  clean and well healed      Impression and Plan   Assessment and Plan:          Diagnosis: Laceration of buttock (NEF41-PL S31.801A).         Course: stitches out without problem.

## 2022-02-16 NOTE — NURSING NOTE
Quick Intake Entered On:  2/24/2020 12:10 PM CST    Performed On:  2/24/2020 12:10 PM CST by Hannah Parks               Summary   Chief Complaint :   BP and weight check    Advance Directive :   Yes   Menstrual Status :   Menarcheal   Weight Measured :   241.6 lb(Converted to: 241 lb 10 oz, 109.59 kg)    Height Measured :   64.5 in(Converted to: 5 ft 4 in, 163.83 cm)    Body Mass Index :   40.83 kg/m2 (HI)    Body Surface Area :   2.23 m2   Height/Length Estimated :   64.5    Systolic Blood Pressure :   117 mmHg   Diastolic Blood Pressure :   84 mmHg (HI)    Mean Arterial Pressure :   95 mmHg   Peripheral Pulse Rate :   71 bpm   BP Site :   Right arm   BP Method :   Electronic   HR Method :   Electronic   Hannah Parks - 2/24/2020 12:10 PM CST

## 2022-02-16 NOTE — NURSING NOTE
"Comprehensive Intake Entered On:  1/27/2020 3:01 PM CST    Performed On:  1/27/2020 2:53 PM CST by Saray George LPN               Summary   Chief Complaint :   \"not well\", SOB, weak, bodyaches, dizziness, no sore throat, no cough, no sinus issues - symptoms x3 weeks, says she has never felt like this before, missed work   Advance Directive :   Yes   Menstrual Status :   Menarcheal   Weight Measured :   224.0 lb(Converted to: 224 lb 0 oz, 101.60 kg)    Height Measured :   64.5 in(Converted to: 5 ft 4 in, 163.83 cm)    Body Mass Index :   37.85 kg/m2 (HI)    Body Surface Area :   2.15 m2   Systolic Blood Pressure :   94 mmHg   Diastolic Blood Pressure :   50 mmHg (LOW)    Mean Arterial Pressure :   65 mmHg   Peripheral Pulse Rate :   118 bpm (HI)    Apical Heart Rate :   0 bpm (LOW)    BP Site :   Right arm   BP Method :   Manual   Temperature Tympanic :   97.4 DegF(Converted to: 36.3 DegC)  (LOW)    Oxygen Saturation :   98 %   Saray George LPN - 1/27/2020 2:53 PM CST   Health Status   Allergies Verified? :   Yes   Medication History Verified? :   Yes   Medical History Verified? :   Yes   Pre-Visit Planning Status :   Completed   Tobacco Use? :   Former smoker   Saray George LPN - 1/27/2020 2:53 PM CST   Meds / Allergies   (As Of: 1/27/2020 3:01:52 PM CST)   Allergies (Active)   azithromycin  Estimated Onset Date:   Unspecified ; Created By:   Lisa Garduno; Reaction Status:   Active ; Category:   Drug ; Substance:   azithromycin ; Type:   Allergy ; Updated By:   Lisa Garduno; Reviewed Date:   12/11/2019 5:15 PM CST        Medication List   (As Of: 1/27/2020 3:01:52 PM CST)   Prescription/Discharge Order    losartan  :   losartan ; Status:   Prescribed ; Ordered As Mnemonic:   losartan 25 mg oral tablet ; Simple Display Line:   1 tab(s), Oral, daily, 7 tab(s), 0 Refill(s) ; Ordering Provider:   Brenda Jacobo; Catalog Code:   losartan ; Order Dt/Tm:   1/9/2020 8:26:17 AM CST          mupirocin " topical  :   mupirocin topical ; Status:   Prescribed ; Ordered As Mnemonic:   Bactroban 2% nasal ointment ; Simple Display Line:   1 stormy, Nasal, BID, for 10 day(s), oint or cream most affordable, 1 gm, 0 Refill(s) ; Ordering Provider:   Brenda Jacobo; Catalog Code:   mupirocin topical ; Order Dt/Tm:   9/24/2019 11:57:17 AM CDT          scopolamine  :   scopolamine ; Status:   Prescribed ; Ordered As Mnemonic:   scopolamine 1.5 mg transdermal film, extended release ; Simple Display Line:   1.5 mg, 1 patch(es), TOP, q72hr, apply 4 hours before event, PRN: for motion sickness, 4 EA, 0 Refill(s) ; Ordering Provider:   Brenda Jacobo; Catalog Code:   scopolamine ; Order Dt/Tm:   7/25/2019 4:48:13 PM CDT          mometasone nasal  :   mometasone nasal ; Status:   Prescribed ; Ordered As Mnemonic:   Nasonex 50 mcg/inh nasal spray ; Simple Display Line:   2 spray(s), nasal, bid, 1 EA, 11 Refill(s) ; Ordering Provider:   Luisa Sandoval PA-C; Catalog Code:   mometasone nasal ; Order Dt/Tm:   3/1/2019 4:02:53 PM CST          medroxyPROGESTERone 10 mg oral tablet  :   medroxyPROGESTERone 10 mg oral tablet ; Status:   Prescribed ; Ordered As Mnemonic:   medroxyPROGESTERone 10 mg oral tablet ; Simple Display Line:   See Instructions, TAKE ONE TABLET BY MOUTH EVERY DAY FOR 10 DAYS EVERY 3 MONTHS., 10 tab(s) ; Ordering Provider:   Brenda Jacobo; Catalog Code:   medroxyPROGESTERone ; Order Dt/Tm:   10/7/2019 2:18:03 PM CDT

## 2022-02-16 NOTE — CARE COORDINATION
Pt appears on _NCB_ chronic disease panel as out of parameters for __HTN.  Pt was seen 12/5/2017 BP was 132/90, RTC placed for 1 yr AWV, RTC placed for 6 mo Med check.

## 2022-02-16 NOTE — PROGRESS NOTES
"   Patient:   UNA BRIONES            MRN: [id number]            FIN: [id number]               Age:   39 years     Sex:  Female     :  1980   Associated Diagnoses:   Alcohol abuse; Dehydration; Dizziness; SOB (shortness of breath)   Author:   Brenda Jacobo      Visit Information      Date of Service: 2020 02:47 pm  Performing Location: [facility]  Encounter#: [id number]      Primary Care Provider (PCP):  Brenda Jacobo    NPI# [id number]      Referring Provider:  Brenda Jacobo    NPI# [id number]      Chief Complaint   2020 2:53 PM CST    \"not well\", SOB, weak, bodyaches, dizziness, no sore throat, no cough, no sinus issues - symptoms x3 weeks, says she has never felt like this before, missed work      History of Present Illness   Una was on my schedule for  a physical today but she is 'not well'.  Her  made her come in, if it were up to her she would not have come because ' I feel too terrible'.  Has not had anything to eat or drink for at least a day. She has SOB with any ambulation, denies any chest pain, denies cough, denies sore throat. Is too nauseated to eat or drink. Denies fever.  Can't recall last void. has alcoholism with hospitalization last spring, she states last drink was yesterday.  Symptoms started 3 weeks ago but much worse in the last 3 days.  Denies travel. They were going to go to [place] but had to cancel.    Forty minutes into her visit I did locate her  , Damian. He tells me Dolores has been depressed, in bed all day drinking alcohol for the past 3 weeks. States she hasn't had anything to eat but has been throwing up multiple times per day.  When I ask Una specifically about depression, she denies any thoughts of hurting herself         Health Status   Allergies:    Allergic Reactions (Selected)  Severity Not Documented  Azithromycin (No reactions were documented)   Medications:  (Selected)   Prescriptions  Prescribed  Bactroban 2% " nasal ointment: 1 stormy, Nasal, BID, Instructions: oint or cream most affordable, # 1 gm, 0 Refill(s), Type: Maintenance, Pharmacy: [facility] #[id number], 1 stormy Nasal bid,x10 day(s),Instr:oint or cream most affordable  Nasonex 50 mcg/inh nasal spray: 2 spray(s), nasal, bid, # 1 EA, 11 Refill(s), Type: Maintenance, Pharmacy: [id number], 2 spray(s) Nasal bid  losartan 25 mg oral tablet: = 1 tab(s), Oral, daily, # 7 tab(s), 0 Refill(s), Type: Maintenance, Pharmacy: [facility] #[id number], 1 tab(s) Oral daily  medroxyPROGESTERone 10 mg oral tablet: See Instructions, Instructions: TAKE ONE TABLET BY MOUTH EVERY DAY FOR 10 DAYS EVERY 3 MONTHS., # 10 tab(s), Type: Soft Stop, Pharmacy: [facility] #[id number], TAKE ONE TABLET BY MOUTH EVERY DAY FOR 10 DAYS EVERY 3 MONTHS.  scopolamine 1.5 mg transdermal film, extended release: 1 patch(es) ( 1.5 mg ), TOP, q72hr, Instructions: apply 4 hours before event, PRN: for motion sickness, # 4 EA, 0 Refill(s), Type: Maintenance, Pharmacy: [facility] #[id number], 1 patch(es) Topical q72 hrs,PRN:for motion sickness,Instr:apply 4 ho...   Problem list:    All Problems  Alcohol abuse / SNOMED CT 06657353 / Confirmed  Hospitalized [facility]  BMI 40.0-44.9, adult / ICD-9-CM V85.41 / Confirmed  Congenital pes planovalgus / SNOMED CT 21651788 / Confirmed  [facility] (Dr Renetta Martin)  Elevated creatine kinase / SNOMED CT 4225227502 / Confirmed  Fatty liver / SNOMED CT 199979754 / Confirmed  Grief / SNOMED CT 286124915 / Confirmed  HTN (hypertension) / SNOMED CT 8451861010 / Confirmed  Hyperlipemia / SNOMED CT 22550393 / Confirmed  Menarche / SNOMED CT 68430338 / Confirmed  Neuritis of foot / SNOMED CT 030308173 / Confirmed  R foot - [facility] (Dr Renetta Martin)  Obesity / ICD-9-.00 / Confirmed  PCOS (polycystic ovarian syndrome) / SNOMED CT 899113033 / Confirmed  Seasonal allergies / SNOMED CT 494254601 / Confirmed  Inactive: History of abnormal  cervical Pap smear / SNOMED CT 4266824279  Resolved: Inpatient stay / SNOMED CT 222430395  @[facility], [place] - Acute kidney injury with alcohol withdrawal  Resolved: Inpatient stay / SNOMED CT 217334987  @[facility] [place], [place] - Suicidal behavior without attempted self-injury  Resolved: Inpatient stay / SNOMED CT 470602539  @[facility], [place] - Alcohol use disorder, severe  Canceled: Cervical dysplasia / SNOMED CT 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      Histories   Past Medical History:    Active  Neuritis of foot (185695074): Onset in the month of 2/2014 at 33 years  Comments:  2/20/2014 CST 1:59 PM CST - Saray George LPN foot - [facility] (Dr Renetta Martin)  Congenital pes planovalgus (06075795): Onset in the month of 2/2014 at 33 years  Comments:  2/20/2014 CST 2:00 PM CST - Saray George LPN, [facility] (Dr Renetta Martin)  HTN (hypertension) (1189980862): Onset on 9/12/2013 at 33 years.  PCOS (polycystic ovarian syndrome) (935579473): Onset on 2/1/2007 at 26 years.  Menarche (97995755): Onset in 1995 at 15 years.  Seasonal allergies (189878415)  Resolved  Inpatient stay (632875329): Onset on 5/20/2019 at 38 years.  Resolved on 5/22/2019 at 38 years.  Comments:  5/28/2019 CDT 5:43 AM CDT - Lisa Garduno  @[facility], [place] - Alcohol use disorder, severe  Inpatient stay (821399294): Onset on 5/19/2019 at 38 years.  Resolved on 5/20/2019 at 38 years.  Comments:  5/22/2019 CDT 7:44 AM CDT - Lisa Garduno  @[facility], [place] - Suicidal behavior without attempted self-injury  Inpatient stay (591016968): Onset on 5/14/2019 at 38 years.  Resolved on 5/16/2019 at 38 years.  Comments:  5/20/2019 CDT 6:50 AM AUTUMNT - Lisa Garduno  @[facility] [facility], [place] - Acute kidney injury with alcohol withdrawal   Family History:    Gout  Father  Diabetes mellitus  Grandmother (M)  Hypertension  Father  Hypothyroidism  Mother  ()  Heart attack  Mother (): onset at 53 .  Multiple sclerosis  Mother ()  [place] - Cancer of prostate  Father: onset at 60 .     Procedure history:    Tonsillectomy and adenoidectomy in  at 9 Years.  LEEP (SNOMED CT 78715453).  [place] tooth (SNOMED CT 39200236).   Social History:        Alcohol Assessment: Current            Current                     Comments:                      2013 - Norma Forrest RN                     1 -3 glasses of wine on some week ends some times      Tobacco Assessment            Past                     Comments:                      2014 - Saray George LPN                     occasional previous tobacco use      Substance Abuse Assessment            Never      Employment and Education Assessment            Employed, Work/School description: Social Work.      Home and Environment Assessment            Marital status: .  Spouse/Partner name: Damian Mcgee.  Lives with Spouse.      Nutrition and Health Assessment            Type of diet: Regular.      Exercise and Physical Activity Assessment                     Comments:                      2015 - Rina Cain RN                     No regular exercise      Sexual Assessment            Sexually active: Yes.  Sexual orientation: Heterosexual.  Uses condoms: No.        Physical Examination   Vital Signs   2020 2:53 PM CST Temperature Tympanic 97.4 DegF  LOW    Apical Heart Rate 0 bpm  LOW    Peripheral Pulse Rate 118 bpm  HI    Systolic Blood Pressure 94 mmHg    Diastolic Blood Pressure 50 mmHg  LOW    Mean Arterial Pressure 65 mmHg    BP Site Right arm    BP Method Manual    Oxygen Saturation 98 %      Measurements from flowsheet : Measurements   2020 2:53 PM CST Height Measured - Standard 64.5 in    Weight Measured - Standard 224.0 lb    BSA 2.15 m2    Body Mass Index 37.85 kg/m2  HI      General:  Alert and oriented, Mild distress, some heavier breathing with  conversation  apical tachy but regular  she has dried blood from each nares that she states she is picking at a sore.  Two or three other scabs on face, chart shows she has lost #17 in the last 7 weeks, appears a little anxious.    Eye:  Pupils are equal, round and reactive to light, Icterus of the conjunctiva present.    HENT:  Tympanic membranes are clear, Normal hearing, dry mucous membranes.    Neck:  Supple, Non-tender, No lymphadenopathy.    Respiratory:  Lungs are clear to auscultation, Respirations are non-labored, Breath sounds are equal, Symmetrical chest wall expansion, some scattered course breath sounds.    Cardiovascular:  Regular rhythm, No murmur, tchycardic.    Gastrointestinal:  Soft.    Musculoskeletal:  Normal range of motion, Normal gait.    Integumentary:  Warm, Dry, No rash.    Neurologic:  Alert, Oriented.    Psychiatric:  Cooperative.       Review / Management   Results review:  Lab results   1/27/2020 3:50 PM CST Sodium Level 126 mmol/L    Potassium Level 4.1 mmol/L    Chloride Level 83 mmol/L    CO2 Level <10 mmol/L    AGAP Unable to calculate    Glucose Level 102 mg/dL    BUN 28 mg/dL    Creatinine Level 2.82 mg/dL    BUN/Creat Ratio 10    eGFR  23    eGFR Non-African American 19    Calcium Level 9.3 mg/dL    Bilirubin Total 3.0 mg/dL    Bilirubin Direct 2.2 mg/dL    Bilirubin Indirect 0.8 mg/dL    Alkaline Phosphatase 152 IU/L    AST/SGOT 159 IU/L    ALT/SGPT 128 IU/L    Protein Total 7.7 g/dL    Albumin Level 4.2 g/dL    Globulin 3.5 g/dL    A/G Ratio 1.2    WBC 4.8    RBC 3.43    Hgb 11.1 g/dL    Hct 32.1 %    MCV 94 fL    MCH 32.4 pg    MCHC 34.6 g/dL    RDW 12.5 %    Platelet 83    MPV 10.1 fL    Metamyelocytes Man 0.0 %    Myelocytes Man 0.0 %    Promyelocytes Man 0.0 %    Blasts Man 0.0 %    Other Cells Man 0.0 %    Lymphocytes 10.0 %    Abs Lymphocytes 0.5    Neutrophils 69.0 %    Abs Neutrophils 3.3    Monocytes 20.0 %    Abs Monocytes 1.0    Eosinophils 1.0 %     Abs Eosinophils 0.0    Basophils 0.0 %    Abs Basophils 0.0    Plasma Cells 0.0 %    Platelet Estimate Decreased    RBC Comments RBC morphology appears normal   .    Radiology results   X-ray, normal heart and lungs per xray   ECG interpretation:  Time  1/27/2020 5:43:00 PM, sinus tach.    Course:  IV was started wtih 0.9 NS infusing upon admission to [facility].       Impression and Plan   Diagnosis     Alcohol abuse (TFX79-CN F10.10).     Dehydration (CGX64-XV E86.0).     Dizziness (BBV70-EQ R42).     SOB (shortness of breath) (LBU90-NT R06.02).     Plan:  Report given to  Hospitalist ' Carlos' and Med surg supervisor Nevaeh,. Advised something for her anxiety upon admission to floor, pt is at risk for Delerium Tremors. Patient will be admitted. Madelyn and her  Damian are in agreement.    Patient Instructions:       Counseled: Patient, Family, Regarding diagnosis, Regarding treatment, Regarding medications, Verbalized understanding.    Orders     Orders (Selected)   Outpatient Orders  Ordered (Collected)  Alcohol, Ethyl, Blood* ([facility]): Specimen Type: Blood, Collection Date: 01/27/20 15:44:00 CST  Ordered (In Transit)  TSH* ([facility]): Specimen Type: Serum, Collection Date: 01/27/20 15:17:00 CST  Completed  68223 iv infusion hydration initial 31 min-1 hour (Charge): Quantity: 1, SOB (shortness of breath)  Dizziness.

## 2022-02-16 NOTE — LETTER
(Inserted Image. Unable to display)   January 29, 2020      UNA BRIONES      [address] [place], [place] [id number]        Dear UNA,    Thank you for selecting [facility] for your healthcare needs.  Below you will find the results of the recent tests done at our clinic.      Mohit Joshi,  The thyroid test is suggestive of mild hypothyroidism.  When you are feeling better we should discuss this.        Result Name Current Result Reference Range   TSH (mIU/L) ((H)) 7.39 1/27/2020    Ethanol Level (Legal)  NONE DETECTED 1/27/2020 NONE DETECTED -    Ethanol Level (mg/dL)  NONE DETECTED 1/27/2020 NONE DETECTED -    Volatile Analysis Performed on:  WHOLE BLOOD 1/27/2020        Please contact me or my assistant at [phone] if you have any questions or concerns.     Sincerely,        RASHEED Vela-NP  Family Nurse Practitioner      What do your labs mean?  Below is a glossary of commonly ordered labs:  LDL   Bad Cholesterol   HDL   Good Cholesterol  AST/ALT   Liver Function   Cr/Creatinine   Kidney Function  Microalbumin   Kidney Function  BUN   Kidney Function  PSA   Prostate    TSH   Thyroid Hormone  HgbA1c   Diabetes Test   Hgb (Hemoglobin)   Red Blood Cells  WBC   White Blood Cell Count

## 2022-02-16 NOTE — TELEPHONE ENCOUNTER
Entered by Cheryl Lindsay on October 03, 2019 4:51:06 PM CDT  ---------------------  From: Cheryl Lindsay   To: [facility] #[id number]    Sent: 10/3/2019 4:51:06 PM CDT  Subject: Medication Management     ** Not Approved: Patient needs appointment, Needs f/u. **  losartan (LOSARTAN 25MG TABLETS)  TAKE 1 TABLET BY MOUTH DAILY  Qty:  90 tab(s)        Days Supply:  30        Refills:  0          Substitutions Allowed     Route To Pharmacy - [facility] #[id number]   Note from Pharmacy:  **Patient requests 90 days supply**  Signed by Cheryl Lindsay            ------------------------------------------  From: [facility] #[id number]  To: Brenda Jacobo  Sent: October 3, 2019 4:46:33 PM CDT  Subject: Medication Management  Due: October 4, 2019 4:46:33 PM CDT    ** On Hold Pending Signature **  Drug: losartan (losartan 25 mg oral tablet)  1 TAB(S) ORAL DAILY,X30 DAY(S)  Quantity: 30 tab(s)     Days Supply: 0         Refills: 0  Substitutions Allowed  Notes from Pharmacy: **Patient requests 90 days supply**    Dispensed Drug: losartan (losartan 25 mg oral tablet)  TAKE 1 TABLET BY MOUTH DAILY  Quantity: 90 tab(s)     Days Supply: 30        Refills: 0  Substitutions Allowed  Notes from Pharmacy: **Patient requests 90 days supply**  ------------------------------------------

## 2022-02-16 NOTE — PROGRESS NOTES
Patient:   UNA BRIONES            MRN: [id number]            FIN: [id number]               Age:   39 years     Sex:  Female     :  1980   Associated Diagnoses:   Alcohol abuse; Depression; Pancreatitis   Author:   Brenda Jacobo      Visit Information      Date of Service: 2020 03:08 pm  Performing Location: [facility]  Encounter#: [id number]      Chief Complaint   3/5/2020 3:13 PM CST     follow up. need clearance for work, feeling better but still work.      History of Present Illness    The patient is here in follow up.  I saw her on 2020 after she had been discharged from the [facility] for acute pancreatitis and alcohol abuse and gastroesophageal reflux disease with esophagitis.  She tells me she followed up by having her assessment over a week ago at the [facility], a substance abuse program in [place].  She finally heard back today from the  and was told she likely should be in an inpatient program.  The patient would prefer an inpatient program but the one that was recommended at [facility] in [place] currently has a waiting list; she was not told how long the waiting list would be.  She was supposed to sign a Release of Information for [facility] so I could talk to the  there and she is going to do that today.     She also would like to get set up to see a psychologist or counselor; she has never done this in the past.  She has been grieving the death of her father.  She has had multiple hospitalizations for alcohol abuse and depression.  She does feel that she is ready now to do this.  She lost the counselor list I gave her earlier but would set something up if she had some guidance.  She is expressing no thoughts of self-harm today.     The patient admits she has continued to drink some since her discharge.  She states she is not drinking every day but spending a lot more time at the gym with her   who is home fulltime with her.  They go to the gym about twice a week.  She feels so weak but feels that walking a mild at the track at the gym will help get her stronger.  She also recognizes that she has too much time on her hands and worries that this will contribute to increased alcohol use.  The patient feels that she is ready to go back to work part-time as a .  Her boss is very flexible with her hours but she would need to be at least 24 hours per week to keep her health insurance and feels she would be able to return to 24 hours per week.  I did encourage this as the ideal plan would be to have her move directly to an inpatient substance abuse program and she would be willing to do that but we are not able to get her in right now.    I asked her to have some blood work done today.  She tells me that she is too weak and tired but promises to come in tomorrow and have it done.  I pointed out to her that it looks like she has had about 30 pound weight loss in three weeks.  Her blood pressure is a little high today but she is afebrile.  It is not clear if the weight taken eleven days ago is accurate, although she certainly had some kidney failure at that time so it is possible that her weight is down significantly.  She tells me she does not have much of an appetite but is drinking a lot of fluids.       Health Status   Allergies:    Allergic Reactions (Selected)  Severity Not Documented  Azithromycin (No reactions were documented)   Medications:  (Selected)   Prescriptions  Prescribed  sucralfate 1 g oral tablet: 1 tab(s), Oral, qid, # 120 tab(s), Type: Soft Stop, Pharmacy: [id number]  Documented Medications  Documented  Protonix: ( 40 mg ), Oral, bid, Instructions: Take before meals - per [facility] discharge, 0 Refill(s), Type: Maintenance  Tylenol: ( 650 mg ), Oral, q4 hrs, Instructions: not to exceed 4000 mg/day - per [facility] discharge, PRN: as needed for mild pain, 0 Refill(s),  Type: Maintenance  ondansetron: ( 4 mg ), Oral, q8 hrs, Instructions: per [facility] discharge, PRN: as needed for nausea/vomiting, 0 Refill(s), Type: Maintenance   Problem list:    All Problems  Alcohol abuse / SNOMED CT 42646893 / Confirmed  Hospitalized [facility]  BMI 40.0-44.9, adult / ICD-9-CM V85.41 / Confirmed  Congenital pes planovalgus / SNOMED CT 45871231 / Confirmed  [facility] (Dr Renetta Martin)  Elevated creatine kinase / SNOMED CT 7518511658 / Confirmed  Fatty liver / SNOMED CT 883164047 / Confirmed  Grief / SNOMED CT 033024675 / Confirmed  HTN (hypertension) / SNOMED CT 1413517557 / Confirmed  Hyperlipemia / SNOMED CT 15753725 / Confirmed  Menarche / SNOMED CT 95719717 / Confirmed  Neuritis of foot / SNOMED CT 550372830 / Confirmed  R foot - [facility] (Dr Renetta Martin)  Obesity / ICD-9-.00 / Confirmed  Pancreatitis / SNOMED CT 328276076 / Confirmed  PCOS (polycystic ovarian syndrome) / SNOMED CT 874040368 / Confirmed  Seasonal allergies / SNOMED CT 412712293 / Confirmed  Inactive: History of abnormal cervical Pap smear / SNOMED CT 9730712038  Resolved: Inpatient stay / SNOMED CT 502029370  @[facility], [place] - Acute kidney injury with alcohol withdrawal  Resolved: Inpatient stay / SNOMED CT 893049804  @[facility], [place] - Suicidal behavior without attempted self-injury  Resolved: Inpatient stay / SNOMED CT 298532035  @[facility], [place] - Alcohol use disorder, severe  Resolved: Inpatient stay / SNOMED CT 962534058  @[facility], [place] - Alcoholic ketoacidosis  Resolved: Inpatient stay / SNOMED CT 712822974  @[facility], [place] - Alcohol induced acute pancreatitis.  Canceled: Cervical dysplasia / SNOMED CT 275852056  Severe with LEEP/cone biopsy.      Histories   Past Medical History:    Active  Neuritis of foot (856130482): Onset in the month of 2/2014 at 33 years  Comments:  2/20/2014 CST 1:59 PM CST - Saray George LPN  - [facility] (Dr Renetta Martin)  Congenital pes planovalgus (45310420): Onset in the month of 2014 at 33 years  Comments:  2014 CST 2:00 PM CST - Saray George LPN (Dr Renetta Martin)  HTN (hypertension) (2938666909): Onset on 2013 at 33 years.  PCOS (polycystic ovarian syndrome) (746540735): Onset on 2007 at 26 years.  Menarche (67920600): Onset in  at 15 years.  Seasonal allergies (741643594)  Resolved  Inpatient stay (941934011): Onset on 2020 at 39 years.  Resolved on 2020 at 39 years.  Comments:  2020 CST 2:16 PM CST - Sybil Simms  @[facility], [place] - Alcohol induced acute pancreatitis.  Inpatient stay (833700353): Onset on 2020 at 39 years.  Resolved on 2020 at 39 years.  Comments:  2020 CST 12:44 PM CST - Sybil Simms  @[facility], [place] - Alcoholic ketoacidosis  Inpatient stay (533728030): Onset on 2019 at 38 years.  Resolved on 2019 at 38 years.  Comments:  2019 CDT 5:43 AM AUTUMNT - Lisa Garduno  @[facility], [place] - Alcohol use disorder, severe  Inpatient stay (573464753): Onset on 2019 at 38 years.  Resolved on 2019 at 38 years.  Comments:  2019 CDT 7:44 AM CDT - Lisa Garduno  @[facility], [place] - Suicidal behavior without attempted self-injury  Inpatient stay (799947180): Onset on 2019 at 38 years.  Resolved on 2019 at 38 years.  Comments:  2019 CDT 6:50 AM Lisa Rivas  @[facility], [place] - Acute kidney injury with alcohol withdrawal   Family History:    Gout  Father  Diabetes mellitus  Grandmother (M)  Hypertension  Father  Hypothyroidism  Mother ()  Heart attack  Mother (): onset at 53 .  Multiple sclerosis  Mother ()  [place] - Cancer of prostate  Father: onset at 60 .     Procedure history:    Tonsillectomy and adenoidectomy in  at 9 Years.  LEE (SNOMED CT 14927682).  Marina tooth  (SNOMED CT 91986184).   Social History:        Alcohol Assessment: Current            Current                     Comments:                      09/04/2013 - Norma Forrest RN                     1 -3 glasses of wine on some week ends some times      Tobacco Assessment            Past                     Comments:                      04/29/2014 - Saray George LPN                     occasional previous tobacco use      Substance Abuse Assessment            Never      Employment and Education Assessment            Employed, Work/School description: Social Work.      Home and Environment Assessment            Marital status: .  Spouse/Partner name: Damian Mcgee.  Lives with Spouse.      Nutrition and Health Assessment            Type of diet: Regular.      Exercise and Physical Activity Assessment                     Comments:                      08/13/2015 - Rina Cain RN                     No regular exercise      Sexual Assessment            Sexually active: Yes.  Sexual orientation: Heterosexual.  Uses condoms: No.        Physical Examination   Vital Signs   3/5/2020 3:13 PM CST Temperature Tympanic 98.9 DegF    Peripheral Pulse Rate 88 bpm    HR Method Manual    Systolic Blood Pressure 140 mmHg  HI    Diastolic Blood Pressure 90 mmHg  HI    Mean Arterial Pressure 107 mmHg      Measurements from flowsheet : Measurements   3/5/2020 3:13 PM CST Height Measured - Standard 64.5 in    Height/Length Estimated 64.5    Weight Measured - Standard 209.6 lb    BSA 2.08 m2    Body Mass Index 35.42 kg/m2  HI      General:  Alert and oriented, No acute distress.    Neck:  Supple, Non-tender.    Respiratory:  Lungs are clear to auscultation, Respirations are non-labored.    Cardiovascular:  Normal rate, Regular rhythm, No murmur, Normal peripheral perfusion.    Integumentary:  Warm, Dry, Pink.       Impression and Plan   Diagnosis     Alcohol abuse (WQK72-WH F10.10).     Depression (FRJ05-HV F32.9).      Pancreatitis (VZG13-MI K85.90).     Patient Instructions:       Counseled: Patient, Regarding diagnosis, Regarding treatment, Regarding medications, Verbalized understanding,    1.  She will sign a release so I can speak with Celia from [facility].    2.  She will set up with a psychologist or counselor as soon as possible.  She was given a list of potential counselors and referral was placed in the chart.  The ideal plan would be to get her into an inpatient facility for substance as soon as possible.     3.  She agrees to return tomorrow for blood work as I am concerned about her weight loss.    4.  I emphasized to her that any alcohol intake could potentially be very damaging to her health and there is every reason for her to completely stop drinking at this time.    Orders     Orders (Selected)   Outpatient Orders  Ordered (Dispatched)  CBC (includes diff/plt)* ([facility]): Specimen Type: Blood, Collection Date: 03/06/20 7:00:00 CST  Comprehensive Metabolic Panel* ([facility]): Specimen Type: Serum, Collection Date: 03/06/20 7:00:00 CST  Lipase* ([facility]): Specimen Type: Serum, Collection Date: 03/06/20 7:00:00 CST.

## 2022-02-16 NOTE — LETTER
(Inserted Image. Unable to display)   February 07, 2020      MADELYN BRIONES      [address] [place], [place] [id number]        Dear MADELYN,    Thank you for selecting [facility] for your healthcare needs.  Below you will find the results of the recent tests done at our clinic.      I tried to call you, Madelyn, but your mailbox is full.  Labs are pretty good. Liver function is still elevated. It is ESSENTIAL that you NOT drink alcohol.  Kidney function has returned to normal, sodium is nearly normal.  These labs will quickly change if you start drinking again  Your thyroid (TSH) is just a touch underactive but the additional thyroid tests are normal so at this time I would NOT recommend adding thyroid medication.  Your are anemia (Hemoglobin is 10.8).  If the medicine for acid reflux is not helping we will need to set you up for an endoscopy (EGD) to look for an ulcer.  The bacteria test for ulcer is not detected/negative. I should see you either this week or next. Take care.      Result Name Current Result Previous Result Reference Range   Sodium Level (mmol/L) ((L)) 134 2/5/2020 ((L)) 126 1/27/2020 135 - 146   Potassium Level (mmol/L)  3.6 2/5/2020  4.1 1/27/2020 3.5 - 5.3   Chloride Level (mmol/L)  98 2/5/2020 ((L)) 83 1/27/2020 98 - 110   CO2 Level (mmol/L)  24 2/5/2020 (( ! )) <10 1/27/2020 20 - 32   Glucose Level (mg/dL) ((H)) 111 2/5/2020 ((H)) 102 1/27/2020 65 - 99   BUN (mg/dL)  11 2/5/2020 ((H)) 28 1/27/2020 7 - 25   Creatinine Level (mg/dL)  0.63 2/5/2020 ((H)) 2.82 1/27/2020 0.50 - 1.10   BUN/Creat Ratio  NOT APPLICABLE 2/5/2020  10 1/27/2020 6 - 22   eGFR (mL/min/1.73m2)  115 2/5/2020  106 7/8/2019 > OR = 60 -    eGFR  (mL/min/1.73m2)  133 2/5/2020 ((L)) 23 1/27/2020 > OR = 60 -    Calcium Level (mg/dL)  9.8 2/5/2020  9.3 1/27/2020 8.6 - 10.2   Bilirubin Total (mg/dL) ((H)) 1.4 2/5/2020 ((H)) 3.0 1/27/2020 0.2 - 1.2   Alkaline Phosphatase (unit/L) ((H)) 167  2/5/2020 ((H)) 152 1/27/2020 31 - 125   AST/SGOT (unit/L) ((H)) 140 2/5/2020 ((H)) 159 1/27/2020 10 - 30   ALT/SGPT (unit/L) ((H)) 73 2/5/2020 ((H)) 128 1/27/2020 6 - 29   Protein Total (gm/dL)  6.2 2/5/2020  7.7 1/27/2020 6.1 - 8.1   Albumin Level (gm/dL)  3.7 2/5/2020  4.2 1/27/2020 3.6 - 5.1   Globulin  2.5 2/5/2020  3.5 1/27/2020 1.9 - 3.7   A/G Ratio  1.5 2/5/2020  1.2 1/27/2020 1.0 - 2.5   T4 Free (ng/dL)  1.4 2/5/2020  0.8 - 1.8   T3 Free (pg/mL)  2.6 2/5/2020  2.3 - 4.2   TSH (mIU/L) ((H)) 7.55 2/5/2020 ((H)) 7.39 1/27/2020    Thyroid Peroxidase Ab (TPO) (IU/mL)  <1 2/5/2020   - <9   WBC  6.1 2/5/2020  4.8 1/27/2020 3.8 - 10.8   RBC ((L)) 3.24 2/5/2020 ((L)) 3.43 1/27/2020 3.80 - 5.10   Hgb (gm/dL) ((L)) 10.8 2/5/2020 ((L)) 11.1 1/27/2020 11.7 - 15.5   Hct (%) ((L)) 30.2 2/5/2020 ((L)) 32.1 1/27/2020 35.0 - 45.0   MCV (fL)  93.2 2/5/2020  94 1/27/2020 80.0 - 100.0   MCH (pg) ((H)) 33.3 2/5/2020  32.4 1/27/2020 27.0 - 33.0   MCHC (gm/dL)  35.8 2/5/2020  34.6 1/27/2020 32.0 - 36.0   RDW (%)  12.9 2/5/2020  12.5 1/27/2020 11.0 - 15.0   Platelet  290 2/5/2020 ((L)) 83 1/27/2020 140 - 400   MPV (fL)  9.7 2/5/2020  10.1 1/27/2020 7.5 - 12.5   Urea Breath Test, Infrared (UBiT)  NOT DETECTED 2/5/2020  NOT DETECTED -        Please contact me or my assistant at [phone] if you have any questions or concerns.     Sincerely,        RASHEED Vela-NP  Family Nurse Practitioner      What do your labs mean?  Below is a glossary of commonly ordered labs:  LDL   Bad Cholesterol   HDL   Good Cholesterol  AST/ALT   Liver Function   Cr/Creatinine   Kidney Function  Microalbumin   Kidney Function  BUN   Kidney Function  PSA   Prostate    TSH   Thyroid Hormone  HgbA1c   Diabetes Test   Hgb (Hemoglobin)   Red Blood Cells  WBC   White Blood Cell Count

## 2022-02-16 NOTE — TELEPHONE ENCOUNTER
Entered by Pan Peterson CMA on October 07, 2019 9:24:18 AM CDT  PCP:   [facility]    Medication:   provera  Last Filled:  9-26-18 #10 and 3 refills    Quantity:  _  Refills:  _    Date of last office visit and reason:   5-24-19 Hospital FU    Date of last Med Check / Px:   medcheck 9/16/18  Date of last labs pertaining to condition:      Note:  Please advise on refills.  This medication was completed off of pt current medlist 9-26-18 Pan Peterson CMA    Return to Clinic order placed?  yes pt was to FU 2 weeks after 5/24/19 visit.    Resource:   _  Phone:   _  Fax:  _          ------------------------------------------  From: [facility] #[id number]  To: Brenda Jacobo  Sent: October 6, 2019 3:41:04 AM CDT  Subject: Medication Management  Due: October 7, 2019 3:41:04 AM CDT    ** On Hold Pending Signature **  Drug: medroxyPROGESTERone (Provera 10 mg oral tablet)  1 TAB DAILY X10 DAYS EVERY 3 MONTHS  Quantity: 10 tab(s)  Days Supply: 0  Refills: 2  Substitutions Allowed  Notes from Pharmacy:     Dispensed Drug: medroxyPROGESTERone (medroxyPROGESTERone 10 mg oral tablet)  TAKE ONE TABLET BY MOUTH EVERY DAY FOR 10 DAYS EVERY 3 MONTHS.  Quantity: 10 tab(s)  Days Supply: 90  Refills: 0  Substitutions Allowed  Notes from Pharmacy:   ---------------------------------------------------------------  From: Pan Peterson CMA ([facility] ([id number]))   To: Brenda Jacobo;     Sent: 10/7/2019 9:24:47 AM CDT  Subject: FW: Medication Management   Due Date/Time: 10/7/2019 3:41:00 AM CDT---------------------  From: Brenda Jacobo   To: Ramesh Lilly ([id number] [facility] [place]);     Sent: 10/7/2019 1:21:58 PM CDT  Subject: RE: Medication Management     please refill 2 weeks only and she should see me in clinic---------------------  From: Pan Peterson CMA   [id number]    Sent: 10/7/2019 2:18:04 PM CDT  Subject: RE: Medication Management     ** Approved **  medroxyPROGESTERone (MEDROXYPROGESTERONE  10MG TABLETS)  TAKE ONE TABLET BY MOUTH EVERY DAY FOR 10 DAYS EVERY 3 MONTHS.  Qty:  10 tab(s)        Days Supply:  90        Refills:  0          Substitutions Allowed     Route To Pharmacy - [facility] #[id number]   Note to Pharmacy:  Pt will need to be seen FOR ANY FURTHER REFILLS per Brenda Sheehan  Signed by Pan Peterson CMA  ** Submitted: **  Order:Return to Clinic (Request)  Details:  RFV: Pt due for annual exam/nedcheck with [facility], Return in 2 weeks         Signed by Pan Peterson CMA  10/7/2019 2:17:00 PM

## 2022-02-16 NOTE — NURSING NOTE
Quick Intake Entered On:  6/25/2019 2:38 PM CDT    Performed On:  6/25/2019 2:37 PM CDT by Yumiko Shaver LPN               Summary   Chief Complaint :   Here for a BP check today.  Update sent to [facility] for review.    Advance Directive :   Yes   Menstrual Status :   Menarcheal   Systolic Blood Pressure :   142 mmHg (HI)    Diastolic Blood Pressure :   90 mmHg (HI)    Mean Arterial Pressure :   107 mmHg   BP Site :   Right arm   BP Method :   Manual   Yumiko Shaver LPN - 6/25/2019 2:37 PM CDT

## 2022-02-16 NOTE — LETTER
(Inserted Image. Unable to display)   July 10, 2019      MADELYN TRACYDAMEON      [address], [place] [phone]        Dear MADELYN,    Thank you for selecting [facility] for your healthcare needs.  Below you will find the results of the recent tests done at our clinic.      Your labs look great, Madelyn, including the kidney function.  Good work with the blood draw.      Result Name Current Result Previous Result Reference Range   Sodium Level (mmol/L)  137 7/8/2019  135 12/7/2017 135 - 146   Potassium Level (mmol/L)  4.7 7/8/2019  4.9 12/7/2017 3.5 - 5.3   Chloride Level (mmol/L)  102 7/8/2019  98 12/7/2017 98 - 110   CO2 Level (mmol/L)  24 7/8/2019  24 12/7/2017 20 - 32   Glucose Level (mg/dL) ((H)) 111 7/8/2019  93 12/7/2017 65 - 99   BUN (mg/dL)  8 7/8/2019  12 12/7/2017 7 - 25   Creatinine Level (mg/dL)  0.73 7/8/2019  0.93 12/7/2017 0.50 - 1.10   BUN/Creat Ratio  NOT APPLICABLE 7/8/2019  NOT APPLICABLE 12/7/2017 6 - 22   eGFR (mL/min/1.73m2)  106 7/8/2019  80 12/7/2017 > OR = 60 -    eGFR  (mL/min/1.73m2)  123 7/8/2019  92 12/7/2017 > OR = 60 -    Calcium Level (mg/dL)  9.9 7/8/2019 ((H)) 11.1 12/7/2017 8.6 - 10.2   Hgb A1c  4.4 7/8/2019  4.9 9/28/2017  - <5.7       Please contact me or my assistant at [phone] if you have any questions or concerns.     Sincerely,        RASHEED Vela-NP  Family Nurse Practitioner      What do your labs mean?  Below is a glossary of commonly ordered labs:  LDL   Bad Cholesterol   HDL   Good Cholesterol  AST/ALT   Liver Function   Cr/Creatinine   Kidney Function  Microalbumin   Kidney Function  BUN   Kidney Function  PSA   Prostate    TSH   Thyroid Hormone  HgbA1c   Diabetes Test   Hgb (Hemoglobin)   Red Blood Cells  WBC   White Blood Cell Count

## 2022-02-16 NOTE — PROGRESS NOTES
Patient:   UNA BRIONES            MRN: [id number]            FIN: [id number]               Age:   39 years     Sex:  Female     :  1980   Associated Diagnoses:   Alcohol abuse; HTN (hypertension); Upper abdominal pain   Author:   Brenda Jacobo      Visit Information      Date of Service: 02/10/2020 02:19 pm  Performing Location: [facility]  Encounter#: [id number]      Primary Care Provider (PCP):  Brenda Jacobo    NPI# [id number]      Referring Provider:  Brenda Jacobo    NPI# [id number]      Chief Complaint   2/10/2020 2:42 PM CST    here today in f/u - reports no improvement, continues to c/o heartburn and weakness, thinking that she may need another note for work as she doesn't feel ready yet        History of Present Illness   Here in f/u  She has been off work since hospitalization for alcohol abuse  she drinks approx 2 shots per day of vodka or rum, states it is  the only think that helps with her abdominal pain  She is on a PPI daily and carafate QID  sometimes she feels the medication is working but wonders if there is something stronger. She still feels weak and doesn't feel she can go to work yet.  She works as a .   She has not pursued out pt treatment for alcoholism, states 'that won't work'. 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  Would like in patient program but something where she doesn't know everyone.    She has decided she would also like to re connect with DR YOVANI Carlos as she trusts him, he cared for her father who had alcoholism and . Many of her drinking problems were exacerbated with his death.    Her  Damain continues to be supportive    Denies any vomiting or blood in stool  when I ask about FMLA or Short term disability, she states she isn't sure if she has any of that, supposes she does      Health Status   Allergies:    Allergic Reactions (Selected)  Severity Not Documented  Azithromycin (No reactions were documented)    Medications:  (Selected)   Prescriptions  Prescribed  Carafate 1 g oral tablet: = 1 tab(s) ( 1 gm ), Oral, qid, # 120 tab(s), 0 Refill(s), Type: Maintenance, Pharmacy: [facility] #[id number], 1 tab(s) Oral qid,x30 day(s)  NexIUM 20 mg oral delayed release capsule: = 1 cap(s) ( 20 mg ), Oral, bid, # 90 cap(s), 0 Refill(s), Type: Maintenance, Pharmacy: [facility] #[id number]  famotidine 20 mg oral tablet: = 1 tab(s) ( 20 mg ), Oral, bid, # 60 tab(s), 0 Refill(s), Type: Maintenance, patient has supply at home (Rx)   Problem list:    All Problems  Alcohol abuse / SNOMED CT 41114065 / Confirmed  Hospitalized [facility]  BMI 40.0-44.9, adult / ICD-9-CM V85.41 / Confirmed  Congenital pes planovalgus / SNOMED CT 30303649 / Confirmed  [facility] (Dr Renetta Maritn)  Elevated creatine kinase / SNOMED CT 8469702507 / Confirmed  Fatty liver / SNOMED CT 791349147 / Confirmed  Grief / SNOMED CT 015307294 / Confirmed  HTN (hypertension) / SNOMED CT 7319635729 / Confirmed  Hyperlipemia / SNOMED CT 16053038 / Confirmed  Menarche / SNOMED CT 50690528 / Confirmed  Neuritis of foot / SNOMED CT 411356787 / Confirmed  R foot - [facility] (Dr Renetta Martin)  Obesity / ICD-9-.00 / Confirmed  PCOS (polycystic ovarian syndrome) / SNOMED CT 984554990 / Confirmed  Seasonal allergies / SNOMED CT 707600946 / Confirmed  Inactive: History of abnormal cervical Pap smear / SNOMED CT 8058964841  Resolved: Inpatient stay / SNOMED CT 892006243  @[facility], [place] - Acute kidney injury with alcohol withdrawal  Resolved: Inpatient stay / SNOMED CT 725488241  @[facility], [place] - Suicidal behavior without attempted self-injury  Resolved: Inpatient stay / SNOMED CT 990909138  @[facility] [place], [place] - Alcohol use disorder, severe  Resolved: Inpatient stay / SNOMED CT 394510851  @[facility], [place] - Alcoholic ketoacidosis  Canceled: Cervical dysplasia / SNOMED CT 073555850  Severe with  LEEP/cone biopsy.      Histories   Past Medical History:    Active  Neuritis of foot (094624111): Onset in the month of 2014 at 33 years  Comments:  2014 CST 1:59 PM CST - Saray George LPN - [facility] (Dr Renetta Martin)  Congenital pes planovalgus (13424572): Onset in the month of 2014 at 33 years  Comments:  2014 CST 2:00 PM RON - Saray George LPN, [facility] (Dr Renetta Martin)  HTN (hypertension) (5120362611): Onset on 2013 at 33 years.  PCOS (polycystic ovarian syndrome) (020587979): Onset on 2007 at 26 years.  Menarche (33474357): Onset in  at 15 years.  Seasonal allergies (447101547)  Resolved  Inpatient stay (392615866): Onset on 2020 at 39 years.  Resolved on 2020 at 39 years.  Comments:  2020 CST 12:44 PM CST - Sybil Simms  @[facility], [place] - Alcoholic ketoacidosis  Inpatient stay (826672275): Onset on 2019 at 38 years.  Resolved on 2019 at 38 years.  Comments:  2019 CDT 5:43 AM CDT - Lisa Garduno  @[facility], [place] - Alcohol use disorder, severe  Inpatient stay (913059220): Onset on 2019 at 38 years.  Resolved on 2019 at 38 years.  Comments:  2019 CDT 7:44 AM CDT - Lisa Garduno  @[facility], [place] - Suicidal behavior without attempted self-injury  Inpatient stay (627938953): Onset on 2019 at 38 years.  Resolved on 2019 at 38 years.  Comments:  2019 CDT 6:50 AM DAGOBERTO - Lisa Garduno  @[facility], [place] - Acute kidney injury with alcohol withdrawal   Family History:    Gout  Father  Diabetes mellitus  Grandmother (M)  Hypertension  Father  Hypothyroidism  Mother ()  Heart attack  Mother (): onset at 53 .  Multiple sclerosis  Mother ()  [place] - Cancer of prostate  Father: onset at 60 .     Procedure history:    Tonsillectomy and adenoidectomy in  at 9 Years.  LEEP (SNOMED CT 51861103).  [place] tooth (SNOMED CT  93946393).   Social History:        Alcohol Assessment: Current            Current                     Comments:                      09/04/2013 - Norma Forrest RN                     1 -3 glasses of wine on some week ends some times      Tobacco Assessment            Past                     Comments:                      04/29/2014 - Saray George LPN                     occasional previous tobacco use      Substance Abuse Assessment            Never      Employment and Education Assessment            Employed, Work/School description: Social Work.      Home and Environment Assessment            Marital status: .  Spouse/Partner name: Damian Mcgee.  Lives with Spouse.      Nutrition and Health Assessment            Type of diet: Regular.      Exercise and Physical Activity Assessment                     Comments:                      08/13/2015 - Rina Cain RN                     No regular exercise      Sexual Assessment            Sexually active: Yes.  Sexual orientation: Heterosexual.  Uses condoms: No.        Physical Examination   Vital Signs   2/10/2020 2:42 PM CST Temperature Tympanic 97.4 DegF  LOW    Peripheral Pulse Rate 108 bpm  HI    Systolic Blood Pressure 128 mmHg    Diastolic Blood Pressure 86 mmHg  HI    Mean Arterial Pressure 100 mmHg    BP Site Right arm    BP Method Manual    Oxygen Saturation 99 %      Measurements from flowsheet : Measurements   2/10/2020 2:42 PM CST Height Measured - Standard 64.5 in    Weight Measured - Standard 219.2 lb    BSA 2.12 m2    Body Mass Index 37.04 kg/m2  HI      General:  Alert and oriented, No acute distress, good eye contact  not trembling or shakey as in previous visit.    Integumentary:  Warm, Dry, Pink.    Neurologic:  Alert, Oriented.    Psychiatric:  Cooperative, Appropriate mood & affect.       Review / Management   Results review:  Lab results   2/5/2020 5:00 PM CST Urea Breath Test, Infrared (UBiT) NOT DETECTED   2/5/2020 4:58  PM CST Sodium Level 134 mmol/L  LOW    Potassium Level 3.6 mmol/L    Chloride Level 98 mmol/L    CO2 Level 24 mmol/L    Glucose Level 111 mg/dL  HI    BUN 11 mg/dL    Creatinine Level 0.63 mg/dL    BUN/Creat Ratio NOT APPLICABLE    eGFR 115 mL/min/1.73m2    eGFR African American 133 mL/min/1.73m2    Calcium Level 9.8 mg/dL    Bilirubin Total 1.4 mg/dL  HI    Alkaline Phosphatase 167 unit/L  HI    AST/SGOT 140 unit/L  HI    ALT/SGPT 73 unit/L  HI    Protein Total 6.2 gm/dL    Albumin Level 3.7 gm/dL    Globulin 2.5    A/G Ratio 1.5    T4 Free 1.4 ng/dL    T3 Free 2.6 pg/mL    TSH 7.55 mIU/L  HI    Thyroid Peroxidase Ab (TPO) <1 IU/mL    WBC 6.1    RBC 3.24  LOW    Hgb 10.8 gm/dL  LOW    Hct 30.2 %  LOW    MCV 93.2 fL    MCH 33.3 pg  HI    MCHC 35.8 gm/dL    RDW 12.9 %    Platelet 290    MPV 9.7 fL   1/27/2020 3:50 PM CST Sodium Level 126 mmol/L    Potassium Level 4.1 mmol/L    Chloride Level 83 mmol/L    CO2 Level <10 mmol/L    AGAP Unable to calculate    Glucose Level 102 mg/dL    BUN 28 mg/dL    Creatinine Level 2.82 mg/dL    BUN/Creat Ratio 10    eGFR  23    eGFR Non-African American 19    Calcium Level 9.3 mg/dL    Bilirubin Total 3.0 mg/dL    Bilirubin Direct 2.2 mg/dL    Bilirubin Indirect 0.8 mg/dL    Alkaline Phosphatase 152 IU/L    AST/SGOT 159 IU/L    ALT/SGPT 128 IU/L    Protein Total 7.7 g/dL    Albumin Level 4.2 g/dL    Globulin 3.5 g/dL    A/G Ratio 1.2    WBC 4.8    RBC 3.43    Hgb 11.1 g/dL    Hct 32.1 %    MCV 94 fL    MCH 32.4 pg    MCHC 34.6 g/dL    RDW 12.5 %    Platelet 83    MPV 10.1 fL    Metamyelocytes Man 0.0 %    Myelocytes Man 0.0 %    Promyelocytes Man 0.0 %    Blasts Man 0.0 %    Other Cells Man 0.0 %    Lymphocytes 10.0 %    Abs Lymphocytes 0.5    Neutrophils 69.0 %    Abs Neutrophils 3.3    Monocytes 20.0 %    Abs Monocytes 1.0    Eosinophils 1.0 %    Abs Eosinophils 0.0    Basophils 0.0 %    Abs Basophils 0.0    Plasma Cells 0.0 %    Platelet Estimate Decreased    RBC  Comments RBC morphology appears normal   .       Impression and Plan   Diagnosis     Alcohol abuse (MJF73-PP F10.10).     HTN (hypertension) (MOZ79-CN I10).     Upper abdominal pain (KBP46-ZP R10.10).     Plan:  I am concerned about an ulcer given low hemoglobin and poor response to PPI and Carafate. Ofcourse she continues to drink ETOH (although in much smaller quantities) so this increases her risk of ulcer of varices as well.  I asked her to consult for EGD and she would like to see DR James. Consult order completed. I did tell her to increase PPI to bid    She also will set up with DR YOVANI Carlos for further follow up    COunseled regarding   need for inpatient treatment program, given some options but encouraged to call insurance and work through there. SHe agrees.    Patient Instructions:       Counseled: Patient, Regarding diagnosis, Regarding treatment, Regarding medications, Verbalized understanding, 25 min with pt, over 20 min in counselng and coord of care.    Orders     Orders (Selected)   Prescriptions  Prescribed  Carafate 1 g oral tablet: = 1 tab(s) ( 1 gm ), Oral, qid, # 120 tab(s), 0 Refill(s), Type: Maintenance, Pharmacy: [facility] #[id number], 1 tab(s) Oral qid,x30 day(s)  NexIUM 20 mg oral delayed release capsule: = 1 cap(s) ( 20 mg ), Oral, bid, # 90 cap(s), 0 Refill(s), Type: Maintenance, Pharmacy: [facility] #[id number].

## 2022-02-16 NOTE — TELEPHONE ENCOUNTER
Entered by Saranya Gutierrez CMA on November 01, 2019 6:59:02 AM CDT  ---------------------  From: Saranya Gutierrez CMA   To: [facility] #[id number]    Sent: 11/1/2019 6:59:02 AM CDT  Subject: Medication Management     ** Submitted: **  Order:losartan (losartan 25 mg oral tablet)  1 tab(s)  Oral  daily  Qty:  30 tab(s)        Days Supply:  30        Refills:  0          Substitutions Allowed     Route To [facility] #[id number]    Signed by Saranya Gutierrez CMA  11/1/2019 6:58:00 AM    ** Submitted: **  Complete:losartan (losartan 25 mg oral tablet)   Signed by Saranya Gutierrez CMA  11/1/2019 6:59:00 AM    ** Not Approved:  **  losartan (LOSARTAN 25MG TABLETS)  TAKE 1 TABLET BY MOUTH DAILY  Qty:  30 tab(s)        Days Supply:  30        Refills:  0          Substitutions Allowed     Route To [facility] #[id number]   Signed by Saranya Gutierrez CMA            ------------------------------------------  From: [facility] #[id number]  To: Brenda Jacobo  Sent: November 1, 2019 6:21:57 AM CDT  Subject: Medication Management  Due: November 2, 2019 6:21:57 AM CDT    ** On Hold Pending Signature **  Drug: losartan (losartan 25 mg oral tablet)  1 TAB(S) ORAL DAILY,X30 DAY(S)  Quantity: 30 tab(s)  Days Supply: 0  Refills: 0  Substitutions Allowed  Notes from Pharmacy:     Dispensed Drug: losartan (losartan 25 mg oral tablet)  TAKE 1 TABLET BY MOUTH DAILY  Quantity: 30 tab(s)  Days Supply: 30  Refills: 0  Substitutions Allowed  Notes from Pharmacy:   ------------------------------------------Med Refill      Date of last office visit and reason:  5/24/19; f/u hospital       Date of last Med Check / Px:   9/26/18; HTN  Date of last labs pertaining to med:  7/8/19    RTC order in chart:  yes; due now    For Protocol refill, has patient been contacted:  msg sent to pharmacy

## 2022-02-16 NOTE — TELEPHONE ENCOUNTER
---------------------  From: Samson/Hannah ANNA (Phone Messages Pool ([id number]_[facility]))   To: [facility] Message Pool ([id number]_[facility]);     Sent: 9/24/2019 10:25:26 AM CDT  Subject: Ointment      Phone Message    PCP: LISS    Time of Call: 1019    Phone Number: [phone] [facility]    Note: Patient called stating that a while back [facility] prescribed an ointment for her nose- Bactroban. States she is having that same issues as before with her nose and was wondering if [facility] would refill it for her.    Pharmacy:  [facility]    Last office visit and reason: 5/24/19 Hospital f/u    Transferred to: LISS---------------------  From: Suma Lockwood ([employer] Message Pool ([id number]_[facility]))   To: Brenda Jacobo;     Sent: 9/24/2019 10:47:27 AM CDT  Subject: FW: Sriram spoke to Madelyn and will send in rx for bactroban and she will schedule with me for a preventative exam as it has been quite a while

## 2022-02-16 NOTE — PROGRESS NOTES
Patient:   UNA BRIONES            MRN: [id number]            FIN: [id number]               Age:   39 years     Sex:  Female     :  1980   Associated Diagnoses:   GERD (gastroesophageal reflux disease)   Author:   Renaldo James MD      Visit Information      Date of Service: 2020 02:07 pm  Performing Location: [facility]  Encounter#: [id number]      Primary Care Provider (PCP):  Aguila GARCIA-CBrenda    NPI# [id number]      Referring Provider:  Renaldo James MD    NPI# [id number]      Chief Complaint       2020 2:17 PM CST EGD consult per [facility]   2/10/2020 2:42 PM CST here today in f/u - reports no improvement, continues to c/o heartburn and weakness, thinking that she may need another note for work as she doesn't feel ready yet         History of Present Illness   Started having heartburn 3 weeks ago when mother in law . Mylanta does not help. Nexium and carafate does help but symptoms increase when laying down. Has been everyday drinker for a couple years. Had been taking ibuprofen 3-4x a week for months for headaches until 3 weeks ago when the headaches resovled. Pain in epigastric region.  Denies coffee. Nonsmoker. No soda.      Review of Systems   Constitutional:  No fever.    Respiratory:  No shortness of breath.    Cardiovascular:  No chest pain.    Gastrointestinal:  No vomiting.    Hematology/Lymphatics:  No bruising tendency, No bleeding tendency.    All other systems reviewed and negative     No history of bleeding disorders or blood transfusion  No prior problems with anesthesia  No family history of anesthesia problems  No positive responses for sleep apnea  Denies plavix, coumadin  Denies history of DVT/PE  Denies recent corticosteroid use    Able to walk a flight of stairs without chest pain or shortness of breath.      Health Status   Allergies:    Allergic Reactions (Selected)  Severity Not Documented  Azithromycin (No reactions were documented)   Medications:   (Selected)   Prescriptions  Prescribed  Carafate 1 g oral tablet: = 1 tab(s) ( 1 gm ), Oral, qid, # 120 tab(s), 0 Refill(s), Type: Maintenance, Pharmacy: [facility] #[id number], 1 tab(s) Oral qid,x30 day(s)  NexIUM 20 mg oral delayed release capsule: = 1 cap(s) ( 20 mg ), Oral, bid, # 90 cap(s), 0 Refill(s), Type: Maintenance, Pharmacy: [facility] #[id number]   Problem list:    All Problems  Alcohol abuse / SNOMED CT 31040233 / Confirmed  BMI 40.0-44.9, adult / ICD-9-CM V85.41 / Confirmed  Congenital pes planovalgus / SNOMED CT 37458479 / Confirmed  Grief / SNOMED CT 601340435 / Confirmed  Hyperlipemia / SNOMED CT 91899049 / Confirmed  HTN (hypertension) / SNOMED CT 9987130086 / Confirmed  Elevated creatine kinase / SNOMED CT 6032089395 / Confirmed  Menarche / SNOMED CT 29329701 / Confirmed  Neuritis of foot / SNOMED CT 732412369 / Confirmed  Obesity / ICD-9-.00 / Confirmed  PCOS (polycystic ovarian syndrome) / SNOMED CT 616267594 / Confirmed  Seasonal allergies / SNOMED CT 853985204 / Confirmed  Fatty liver / SNOMED CT 236787398 / Confirmed  Inactive: History of abnormal cervical Pap smear / SNOMED CT 2619576852  Resolved: Inpatient stay / SNOMED CT 391226439  Resolved: Inpatient stay / SNOMED CT 073754620  Resolved: Inpatient stay / SNOMED CT 916609973  Resolved: Inpatient stay / SNOMED CT 550783549  Canceled: Cervical dysplasia / SNOMED CT 801632025      Histories   Procedure history:    Tonsillectomy and adenoidectomy in 1989 at 9 Years.  LEEP (SNOMED CT 90511765).  [place] tooth (SNOMED CT 50948160).     Family History: Dad alcoholic  Social History:  (Damian) 2014. No children.  [facility]      Physical Examination   Vital Signs   2/11/2020 2:17 PM CST Temperature Tympanic 97.9 DegF    Peripheral Pulse Rate 103 bpm  HI    Systolic Blood Pressure 137 mmHg  HI    Diastolic Blood Pressure 90 mmHg  HI    Mean Arterial Pressure 106 mmHg    BP Site Right arm    BP Method  Electronic    Oxygen Saturation 98 %   2/10/2020 2:42 PM CST Temperature Tympanic 97.4 DegF  LOW    Peripheral Pulse Rate 108 bpm  HI    Systolic Blood Pressure 128 mmHg    Diastolic Blood Pressure 86 mmHg  HI    Mean Arterial Pressure 100 mmHg    BP Site Right arm    BP Method Manual    Oxygen Saturation 99 %      Measurements from flowsheet : Measurements   2/11/2020 2:17 PM CST Height/Length Estimated 64.5    Weight Measured - Standard 219 lb   2/10/2020 2:42 PM CST Height Measured - Standard 64.5 in    Weight Measured - Standard 219.2 lb    BSA 2.12 m2    Body Mass Index 37.04 kg/m2  HI      General:  Alert and oriented, No acute distress.    HENT:  No pharyngeal erythema.    Respiratory:  Lungs are clear to auscultation.    Cardiovascular:  Normal rate, Regular rhythm.    Gastrointestinal:  Soft, Non-distended, epigastric tenderness. Negative Judd's sign.    Musculoskeletal:  Normal range of motion, Normal strength, Normal gait.    Neurologic:  No focal deficits.    Psychiatric:  Appropriate mood & affect.       Review / Management   Results review:  Lab results   2/5/2020 4:58 PM CST Sodium Level 134 mmol/L  LOW    CO2 Level 24 mmol/L    Creatinine Level 0.63 mg/dL    AST/SGOT 140 unit/L  HI    ALT/SGPT 73 unit/L  HI    T4 Free 1.4 ng/dL    TSH 7.55 mIU/L  HI    WBC 6.1    Hgb 10.8 gm/dL  LOW    Platelet 290   1/27/2020 3:50 PM CST Sodium Level 126 mmol/L    CO2 Level <10 mmol/L    Creatinine Level 2.82 mg/dL    AST/SGOT 159 IU/L    ALT/SGPT 128 IU/L    WBC 4.8    Hgb 11.1 g/dL    Platelet 83   .       Impression and Plan   Diagnosis     GERD (gastroesophageal reflux disease) (LEX61-ZD K21.9).       Continue nexium and carafate. Risks and benefits of EGD discussed. Concerned risks greater as still drinking. Patient desires to start with esophagram and Upper GI  Labs improved following IV hydration while hospitalized  Alcoholism: refer for inpatient treatment center    Addendum 2/12: Discussed normal UGI with  no obvious ulcer. If pain increases and needs EGD then should have done as inpatient if still drinking (risks of procedure increased with her drinking)

## 2022-02-16 NOTE — TELEPHONE ENCOUNTER
---------------------  From: Brenda Jacobo   Sent: 3/18/2020 1:32:00 PM CDT  Subject: General Message     I left a message for aMdelyn to check in to see how she is doing and to ask her if it is okay if I talk to HAYES Dean with [facility] (it is not clear to me if the ROS on file covers a conversation with Skylar)

## 2022-02-16 NOTE — TELEPHONE ENCOUNTER
---------------------  From: Nadya DECKER, Michelle BOWER ([facility]Rx Pool ([id number]_[facility]))   To: [facility] Message Pool ([id number]_[facility]);     Sent: 3/2/2020 3:02:52 PM CST  Subject: FW: Medication Management   Due Date/Time: 3/2/2020 9:25:00 AM CST     Patient has appointment with NCYOV[facility] tomorrow at 1400.    Please address at that time.      ------------------------------------------  From: [facility] #[id number]  To: Brenda Jacobo  Sent: March 1, 2020 9:25:22 AM CST  Subject: Medication Management  Due: March 2, 2020 9:25:22 AM CST    ** On Hold Pending Signature **  Drug: sucralfate (sucralfate 1 g oral tablet)  TAKE 1 TABLET BY MOUTH FOUR TIMES DAILY  Quantity: 120 tab(s)  Days Supply: 30  Refills: 0  Substitutions Allowed  Notes from Pharmacy:     Dispensed Drug: sucralfate (sucralfate 1 g oral tablet)  TAKE 1 TABLET BY MOUTH FOUR TIMES DAILY  Quantity: 120 tab(s)  Days Supply: 30  Refills: 0  Substitutions Allowed  Notes from Pharmacy:   ---------------------------------------------------------------  From: Saray George LPN ([facility] Message Pool ([id number]_[facility]))   To: Brenda Jacobo;     Sent: 3/2/2020 6:09:54 PM CST  Subject: FW: Medication Management   Due Date/Time: 3/2/2020 9:25:00 AM CST---------------------  From: Saray George LPN ([facility] Message Pool ([id number]_[facility]))   To: Brenda Jacobo;     Sent: 3/2/2020 6:10:22 PM CST  Subject: FW: Medication Management   Due Date/Time: 3/2/2020 9:25:00 AM CST---------------------  From: Brenda Jacobo   To: [facility] [employer] Pool ([id number]_[facility]);     Sent: 3/2/2020 6:56:34 PM CST  Subject: RE: Medication Management     her appt is with me on the 4th, she should keep apptointment but I will refill---------------------  From: Brenda Jacobo   To: [facility] #[id number]    Sent: 3/2/2020 6:56:44 PM CST  Subject: FW: Medication Management     ** Submitted: **  Complete:sucralfate (Carafate 1 g oral tablet)   Signed by Aguila  Brenda ROSA  3/2/2020 6:56:00 PM    ** Approved **  sucralfate (SUCRALFATE 1GM TABLETS)  TAKE 1 TABLET BY MOUTH FOUR TIMES DAILY  Qty:  120 tab(s)        Days Supply:  30        Refills:  0          Substitutions Allowed     Route To Pharmacy - [id number].

## 2022-02-16 NOTE — TELEPHONE ENCOUNTER
---------------------  From: Tha BOSCHTwyla   Sent: 10/10/2019 3:33:02 PM CDT  Subject: appt cancel/Td q     Pt LM at 1520 stating had to cancel annual today with [facility] d/t needing to be on a court conference call at 1530 (appt was scheduled at 1500). Also concern that stepped on barbed wire yesterday but feels that it did not puncture foot, but area is tender. Wondering if Td UTD.     I LM at 1530 stating cancelling appt ok, just to be sure to reschedule. Informed her last Tdap was 12/29/15 and rule of thumb is dirty cut (q 5 years), clean cut (q 10 years). Advised to monitor site and keep clean. Come in if any s/s of infection.

## 2022-02-16 NOTE — NURSING NOTE
Comprehensive Intake Entered On:  3/5/2020 3:19 PM CST    Performed On:  3/5/2020 3:13 PM CST by Libby De Leon LPN               Summary   Chief Complaint :   follow up. need clearance for work, feeling better but still work.    Advance Directive :   Yes   Menstrual Status :   Menarcheal   Weight Measured :   209.6 lb(Converted to: 209 lb 10 oz, 95.07 kg)    Height Measured :   64.5 in(Converted to: 5 ft 4 in, 163.83 cm)    Body Mass Index :   35.42 kg/m2 (HI)    Body Surface Area :   2.08 m2   Height/Length Estimated :   64.5    Systolic Blood Pressure :   140 mmHg (HI)    Diastolic Blood Pressure :   90 mmHg (HI)    Mean Arterial Pressure :   107 mmHg   Peripheral Pulse Rate :   88 bpm   HR Method :   Manual   Temperature Tympanic :   98.9 DegF(Converted to: 37.2 DegC)    Libby De Leon LPN - 3/5/2020 3:13 PM CST   Health Status   Allergies Verified? :   Yes   Medication History Verified? :   Yes   Medical History Verified? :   Yes   Pre-Visit Planning Status :   Completed   Tobacco Use? :   Former smoker   Libby De Leon LPN - 3/5/2020 3:13 PM CST   Consents   Consent for Immunization Exchange :   Consent Granted   Consent for Immunizations to Providers :   Consent Granted   Libby De Leon LPN - 3/5/2020 3:13 PM CST   Meds / Allergies   (As Of: 3/5/2020 3:19:01 PM CST)   Allergies (Active)   azithromycin  Estimated Onset Date:   Unspecified ; Created By:   Lisa Garduno; Reaction Status:   Active ; Category:   Drug ; Substance:   azithromycin ; Type:   Allergy ; Updated By:   Lisa Garduno; Reviewed Date:   3/5/2020 3:13 PM CST        Medication List   (As Of: 3/5/2020 3:19:01 PM CST)   Prescription/Discharge Order    oxyCODONE  :   oxyCODONE ; Status:   Processing ; Ordered As Mnemonic:   oxyCODONE 5 mg oral capsule ; Simple Display Line:   See Instructions, 1 -2 cap(s) Oral in the evening for severe pain, 12 cap(s), 0 Refill(s) ; Ordering Provider:   Brenda Jacobo; Action Display:   Delete ; Catalog Code:    oxyCODONE ; Order Dt/Tm:   3/5/2020 3:16:32 PM CST          sucralfate 1 g oral tablet  :   sucralfate 1 g oral tablet ; Status:   Prescribed ; Ordered As Mnemonic:   sucralfate 1 g oral tablet ; Simple Display Line:   1 tab(s), Oral, qid, 120 tab(s) ; Ordering Provider:   Brenda Jacobo; Catalog Code:   sucralfate ; Order Dt/Tm:   3/2/2020 6:56:43 PM CST            Home Meds    acetaminophen  :   acetaminophen ; Status:   Documented ; Ordered As Mnemonic:   Tylenol ; Simple Display Line:   650 mg, Oral, q4 hrs, not to exceed 4000 mg/day - per [facility] discharge, PRN: as needed for mild pain, 0 Refill(s) ; Catalog Code:   acetaminophen ; Order Dt/Tm:   2/19/2020 2:39:37 PM CST          ondansetron  :   ondansetron ; Status:   Documented ; Ordered As Mnemonic:   ondansetron ; Simple Display Line:   4 mg, Oral, q8 hrs, per [facility] discharge, PRN: as needed for nausea/vomiting, 0 Refill(s) ; Catalog Code:   ondansetron ; Order Dt/Tm:   2/19/2020 2:40:09 PM CST          pantoprazole  :   pantoprazole ; Status:   Documented ; Ordered As Mnemonic:   Protonix ; Simple Display Line:   40 mg, Oral, bid, Take before meals - per [facility] discharge, 0 Refill(s) ; Catalog Code:   pantoprazole ; Order Dt/Tm:   2/19/2020 2:40:45 PM CST

## 2022-02-16 NOTE — TELEPHONE ENCOUNTER
Entered by Saranya Gutierrez CMA on December 02, 2019 12:23:07 PM CST  ---------------------  From: Saranya Gutierrez CMA   To: [facility] #[id number]    Sent: 12/2/2019 12:23:07 PM CST  Subject: Medication Management     ** Submitted: **  Order:losartan (losartan 25 mg oral tablet)  1 tab(s)  Oral  daily  Qty:  30 tab(s)        Days Supply:  30        Refills:  0          Substitutions Allowed     Route To [facility] #[id number]    Signed by Saranya Gutierrez CMA  12/2/2019 12:22:00 PM    ** Submitted: **  Complete:losartan (losartan 25 mg oral tablet)   Signed by Saranya Gutierrez CMA  12/2/2019 12:23:00 PM    ** Not Approved:  **  losartan (LOSARTAN 25MG TABLETS)  TAKE 1 TABLET BY MOUTH EVERY DAY  Qty:  30 tab(s)        Days Supply:  30        Refills:  0          Substitutions Allowed     Route To [facility] #[id number]   Signed by Saranya Gutierrez CMA            ------------------------------------------  From: [facility] #[id number]  To: Brenda Jacobo  Sent: December 1, 2019 3:40:35 AM CST  Subject: Medication Management  Due: December 2, 2019 3:40:35 AM CST    ** On Hold Pending Signature **  Drug: losartan (losartan 25 mg oral tablet)  1 TAB(S) ORAL DAILY  Quantity: 30 tab(s)  Days Supply: 0  Refills: 0  Substitutions Allowed  Notes from Pharmacy: Needs appt for further refills    Dispensed Drug: losartan (losartan 25 mg oral tablet)  TAKE 1 TABLET BY MOUTH EVERY DAY  Quantity: 30 tab(s)  Days Supply: 30  Refills: 0  Substitutions Allowed  Notes from Pharmacy:   ---------------------------------------------------------------  From: Saranya Gutierrez CMA (eRx Pool (32224_[facility]))   To: Phone Messages Pool (32224_[place] [facility] [place]);     Sent: 12/2/2019 12:23:37 PM CST  Subject: FW: Medication Management   Due Date/Time: 12/2/2019 3:40:00 AM CST     Med Refill      Date of last office visit and reason:  5/24/19; f/u hospital       Date of last Med Check / Px:   9/26/18; HTN  Date of  last labs pertaining to med:  7/8/19    RTC order in chart:  yes; due now    For Protocol refill, has patient been contacted:  msg sent to pharmacy **Please remind pt she is due for an appt**LM on identified VM at 0844 to set up appt

## 2022-02-16 NOTE — PROGRESS NOTES
Chief Complaint    c/o nasal congestion, cough at night, sore throat, ear pain for the past couple of days  History of Present Illness      Chief complaint as above reviewed and confirmed with patient.  Pt presents to the clinic with concerns re: nasal congestion, rhinorrhea. no fevers. mild cough. no sob or difficulty breathing. no chest pain. no facial pain or tooth pain.  no rash.  She uses Afrin daily for years.  Review of Systems      Review of systems is negative with the exception of those noted in HPI          Physical Exam   Vitals & Measurements    T: 98.2   F (Tympanic)  HR: 76(Peripheral)  BP: 124/80     HT: 64.5 in  WT: 230 lb  BMI: 38.87           Vitals as above per nursing documentation           Constitutional : nad appears well          Ears: ears patent B, TMS intact, noninjected           Nose: nasal mucosa is ededmatous. no discharge           Throat: pharynx is nonerythematous, no tonsillar hypertrophy, no exudate           Neck: neck supple, no adenopathy, no thyromegaly, no rigidity           Lungs: lungs CTA', no Wheezes, rhonchi or rales           Heart: heart RRR, nl S1, S2 no murmur           skin:  No rashes              Assessment/Plan       1. Acute URI (J06.9)         tessalon for cough.  push fluids. rest and ibuprofen or tylenol for comfort. she has severe   fu with pcp for persistent or worsening sx. She has very severe nasal congestion for addiction to afrin thus I recommend she stop and she is concerned about sx, trial of prednisone and start nasonex. fu with pcp for persistent or worsening sx.       Orders:         benzonatate, See Instructions, Instructions: 1-2 cap(s) PO TID 10 day(s), # 30 tab(s), 1 Refill(s), Type: Maintenance, Pharmacy: [id number], 1-2 cap(s) PO TID 10 day(s), (Ordered)         mometasone nasal, 2 spray(s), nasal, bid, # 1 EA, 11 Refill(s), Type: Maintenance, Pharmacy: [facility], 2 spray(s) Nasal bid, (Ordered)          predniSONE, = 2 tab(s) ( 40 mg ), PO, Daily, # 14 tab(s), 0 Refill(s), Type: Maintenance, Pharmacy: [id number], 2 tab(s) Oral daily,x7 day(s), (Ordered)  Patient Information     Name:UNA BRIONES      Address:      [address] [place] [id number]     Sex:Female     YOB: 1980     [phone]     Emergency Contact:ERVIN BRIONES     MRN:[id number]     FIN:[id number]     Location:[facility]     Date of Service:03/01/2019      Primary Care Physician:       Brenda Jacobo, [phone]      Attending Physician:       Luisa Sandoval PA-C, [phone]  Problem List/Past Medical History    Ongoing     BMI 40.0-44.9, adult     Congenital pes planovalgus       Comments: [facility] (Dr Renetta Martin)     Fatty liver     History of abnormal pap smear     HTN (Hypertension)     Hyperlipemia     Menarche     Neuritis of foot       Comments: R foot - [facility] (Dr Renetta Martin)     Obesity     PCOS (Polycystic Ovarian Syndrome)     Seasonal allergies    Historical     No qualifying data  Procedure/Surgical History     Tonsillectomy and adenoidectomy (1989)           LEEP           Mobile tooth        Medications     Afrin Nasal Sinus: 2 spray(s), nasal, bid, 0 Refill(s).     lisinopril 20 mg oral tablet: 20 mg, 1 tab(s), po, daily, 90 tab(s), 1 Refill(s).     Tessalon Perles 100 mg oral capsule: See Instructions, 1-2  cap(s) PO TID 10 day(s), 30 tab(s), 1 Refill(s).     Nasonex 50 mcg/inh nasal spray: 2 spray(s), nasal, bid, 1 EA, 11 Refill(s).     predniSONE 20 mg oral tablet: 40 mg, 2 tab(s), PO, Daily, for 7 day(s), 14 tab(s), 0 Refill(s).          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 Spouse/Partner name: Damian Mcgee. Lives with Spouse., 08/20/2015     Nutrition and Health      Type of diet: Regular., 08/13/2015     Sexual      Sexually active: Yes. Sexual orientation: Heterosexual. Uses condoms: No., 08/13/2015     Substance Abuse      Never, 08/13/2015     Tobacco      Past, 04/29/2014  Family History    CA - Cancer of prostate: Father (Dx at 60).    Diabetes mellitus: Grandmother (M).    Gout: Father.    Heart attack: Mother (Dx at 53).    Hypertension: Father.    Hypothyroidism: Mother.    Multiple sclerosis: Mother.  Immunizations      Vaccine Date Status      influenza virus vaccine, inactivated 09/26/2018 Given      tetanus/diphth/pertuss (Tdap) adult/adol 12/29/2015 Recorded      influenza virus vaccine, inactivated 09/12/2013 Given      tetanus/diphth/pertuss (Tdap) adult/adol 06/01/2006 Recorded      MMR (measles/mumps/rubella) 12/12/1983 Recorded

## 2022-02-16 NOTE — TELEPHONE ENCOUNTER
Entered by Cherly Lindsay on October 03, 2019 4:38:29 PM CDT  ---------------------  From: Cheryl Lindsay   To: [facility] #[id number]    Sent: 10/3/2019 4:38:29 PM CDT  Subject: Medication Management     ** Submitted: **  Order:losartan (losartan 25 mg oral tablet)  1 tab(s)  Oral  daily  Qty:  30 tab(s)        Duration:  30 day(s)        Refills:  0          Substitutions Allowed     Route To Pharmacy - [facility] #[id number]    Signed by Cheryl Lindsay  10/3/2019 4:38:00 PM    ** Submitted: **  Complete:losartan (losartan 25 mg oral tablet)   Signed by Cheryl Lindsay  10/3/2019 4:38:00 PM    ** Not Approved:  **  losartan (LOSARTAN 25MG TABLETS)  TAKE 1 TABLET BY MOUTH DAILY TO REPLACE LISINOPRIL  Qty:  90 tab(s)        Days Supply:  90        Refills:  0          Substitutions Allowed     Route To [facility] - [facility] #[id number]   Signed by Cheryl Lindsay            Med Refill    Date of last office visit and reason:  hospital f/u 5/24/19  Date of last Med Check / Px:   9/26/18  Date of last labs pertaining to med:  7/8/19  RTC order in chart:  yes             ------------------------------------------  From: [facility] #[id number]  To: Brenda Jacobo  Sent: October 3, 2019 3:43:38 PM CDT  Subject: Medication Management  Due: October 4, 2019 3:43:38 PM CDT    ** On Hold Pending Signature **  Drug: losartan (losartan 25 mg oral tablet)  1 TAB(S) ORAL DAILY,INSTR:TO REPLACE LISINOPRIL  Quantity: 90 tab(s)     Days Supply: 0         Refills: 0  Substitutions Allowed  Notes from Pharmacy:     Dispensed Drug: losartan (losartan 25 mg oral tablet)  TAKE 1 TABLET BY MOUTH DAILY TO REPLACE LISINOPRIL  Quantity: 90 tab(s)     Days Supply: 90        Refills: 0  Substitutions Allowed  Notes from Pharmacy:   ------------------------------------------

## 2022-02-16 NOTE — TELEPHONE ENCOUNTER
---------------------  From: Neyda Mckeon   Sent: 5/17/2019 10:56:34 AM CDT  Subject: Scheduling Management     PT NO SHOWED APPT 05.17.19

## 2022-02-16 NOTE — TELEPHONE ENCOUNTER
---------------------  From: Tari Campa   To: Brenda Jacobo;     Sent: 1/6/2020 11:40:24 AM CST  Subject: Scheduling Management     Patient canceled appointment for today. Appointment was for a physical patient is going to call back to schedule once she looks at her work schedule.

## 2022-02-16 NOTE — TELEPHONE ENCOUNTER
Received a follow-up call from Mirna at [facility] in [place] ([phone]) .  She informed me that she contacted patient to get additional info and let her know that they would be able to accept her today to start detox.  Patient declined to come.  Informed Dr. James.

## 2022-02-16 NOTE — LETTER
(Inserted Image. Unable to display)   October 22, 2019        UNA BRIONES  [address]  [place], [place] [id number]        Dear UNA,      Thank you for selecting [facility] for your healthcare needs.    Our records indicate you are due for the following services:     Annual Physical & Medication Check     To schedule an appointment or if you have further questions, please contact your primary clinic:   [facility]       [phone]   [facility]       [phone]              [facility]     [phone]      Powered by [employer]    Sincerely,    ROBE JohnstonNP

## 2022-02-16 NOTE — CARE COORDINATION
Patient:   UNA BRIONES            MRN: [id number]            FIN: [id number]               Age:   38 years     Sex:  Female     :  1980   Associated Diagnoses:   None   Author:   Marie James CMA      Sources of Information:  [ ] Patient, family member, or caregiver (Please list):  [ ] Hospital discharge summary  [ ] Hospital fax  [x ] List of recent hospitalizations or ED visits  [ ] Other:     Discharged From:  ED   Discharge Date: 19    Diagnosis/Problem: Suicidal Ideation    Medication Changes: [ ] Yes [x ] No   Medication List Updated: [ ] Yes [x ] No    Needs Referral or Lab: [x ] Yes [ ] No  Alcohol treatment  Needs Follow-up Appointment:  [x ] Within 7 days of discharge (highly complex visit)  [ ] Within 14 days of discharge (moderately complex visit)    Appointment Made With: none  Date:    I called pt. I asked her how she was doing. She said not very well-I am sitting in room 202 right now. I apologized-didnt realize she was back in. She said that was fine. Not sure when she would get to f/u with [facility] but will keep us updated. Pt presented back to ED on  and is currently at inpatient psych.

## 2022-02-16 NOTE — TELEPHONE ENCOUNTER
---------------------  From: Gayla Michaud CMA (Phone Messages Pool (32224_[facility]))   To: [facility] Message Pool (32224_[facility]);     Sent: 2/17/2020 2:46:14 PM CST  Subject: FMLA      Phone Message    PCP:   [facility]ISS      Time of Call:  1430       Person Calling:  Pt  Phone number:  [phone] LM okay    Returned call at: 1435    Note:   She's wondering if FMLA paperwork was sent to her work yet? Looked in [facility] inbox didn't see the FMLA paperwork. Please advise she is aware [facility] is OC until tomorrow.    Last office visit and reason:  2-10-20 abd pain/alcoholism with [id number] letting her know that FMLA paperwork has been faxed attn: Martin Ambrosio @ [phone]. Confirmation received. I left the original paperwork at the  so she can pick that up at her convenience.

## 2022-02-16 NOTE — PROGRESS NOTES
Patient:   UNA BRIONES            MRN: [id number]            FIN: [id number]               Age:   36 years     Sex:  Female     :  1980   Associated Diagnoses:   Shingles   Author:   Beck Lipscomb MD      Visit Information      Date of Service: 2017 01:28 pm  Performing Location: [facility]  Encounter#: [id number]      Primary Care Provider (PCP):  Aguila GARCIA-CBrenda    NPI# [id number]      Referring Provider:  Beck Lipscomb MD    NPI# [id number]      Chief Complaint   2017 1:33 PM CDT     Pt c/o low back pain. Has seen chiro. Not UTI related. Pt c/o rash in the middle of her back. Started with itch and now is burning. Started about 3 days ago.        History of Present Illness             The patient presents with rash.  The location of the rash is on the right, back, abdomen.  The rash is described as red, itching, burning and herpetic.  The severity of the symptom(s) associated to the rash is moderate.  The timing/ course of symptom(s) related to the rash is worsening.  The rash has lasted for 3 day(s).  Complaint: mild right low back pain, wondering if it's ok for her to see the chiropracter .        Review of Systems   Constitutional:  No fever.       Health Status   Allergies:    Allergic Reactions (Selected)  Severity Not Documented  Zithromax (Face swelling)   Medications:  (Selected)   Prescriptions  Prescribed  NuvaRing 0.120 mg-0.015 mg/24 hours vaginal rin EA, VAG, q4wk, # 3 EA, 1 Refill(s), Type: Maintenance, Pharmacy: [facility], 1 EA vag q4 wks  Valtrex 1 g oral tablet: 1 tab(s) ( 1 gm ), PO, q8hr, # 21 tab(s), 0 Refill(s), Type: Maintenance, Pharmacy: [facility], 1 tab(s) po q8 hrs,x7 day(s)  lisinopril 10 mg oral tablet: ( 10 mg ), po, daily, # 90 tab(s), 3 Refill(s), Type: Maintenance, Pharmacy: [facility], 10 mg po daily  metFORMIN 500 mg oral tablet: 1 tab(s) ( 500 mg ), po, bid, # 180 tab(s), 3  Refill(s), Type: Maintenance, Pharmacy: [facility], 1 tab(s) po bid  Documented Medications  Documented  Afrin Nasal Sinus: 2 spray(s), nasal, bid, 0 Refill(s), Type: Maintenance      Histories   Past Medical History:    Active  Neuritis of foot (311550339): Onset in the month of 2014 at 33 years  Comments:  2014 CST 1:59 PM CST - Saray George CMA foot - [facility] (Dr Renetta Martin)  Congenital pes planovalgus (43901462): Onset in the month of 2014 at 33 years  Comments:  2014 CST 2:00 PM RON - Saray George CMA, [facility] (Dr Renetta Martin)  PCOS (Polycystic Ovarian Syndrome) (256.4): Onset on 2007 at 26 years.  Menarche (81253231): Onset in  at 15 years.  Seasonal allergies (907700319)   Family History:    Gout  Father  Diabetes mellitus  Grandmother (M)  Hypertension  Father  Hypothyroidism  Mother ()  Heart attack  Mother (): onset at 53 .  Multiple sclerosis  Mother ()  CA - Cancer of prostate  Father: onset at 60 .        Physical Examination   Vital Signs   2017 1:33 PM CDT Temperature Tympanic 99.1 DegF    Peripheral Pulse Rate 105 bpm  HI    Systolic Blood Pressure 144 mmHg  HI    Diastolic Blood Pressure 102 mmHg  HI    Mean Arterial Pressure 116 mmHg      Measurements from flowsheet : Measurements   2017 1:33 PM CDT Height Measured - Standard 64.5 in    Weight Measured - Standard 253 lb    BSA 2.28 m2    Body Mass Index 42.75 kg/m2      General:  Alert and oriented, No acute distress.    Eye:  Normal conjunctiva.    Respiratory:  Respirations are non-labored.    Musculoskeletal:  back nontender and with good rom.    Integumentary:  papulo-vesicular lesion involving the right lower back and also right inguinal region. Does not cross midline. .    Psychiatric:  Cooperative, Appropriate mood & affect.       Impression and Plan   Diagnosis     Shingles (TVO48-ER B02.9).     Course:  Worsening.    Orders     Orders  (Selected)   Prescriptions  Prescribed  Valtrex 1 g oral tablet: 1 tab(s) ( 1 gm ), PO, q8hr, # 21 tab(s), 0 Refill(s), Type: Maintenance, Pharmacy: [facility], 1 tab(s) po q8 hrs,x7 day(s).       Advised chiropracter OK for her lower back pain. Will have her return for BP check.

## 2022-02-16 NOTE — TELEPHONE ENCOUNTER
Entered by Isamar Hanley CMA on February 04, 2020 10:51:45 AM CST  Please advise on losartan refill- was discontinued off medication list.        ** Not Approved: RESPONDED BY OTHER MEANS **  sucralfate (SUCRALFATE 1GM TABLETS)  TAKE 1 TABLET BY MOUTH FOUR TIMES DAILY  Qty:  360 tab(s)        Days Supply:  30        Refills:  0          Substitutions Allowed     Route To Pharmacy - [facility] #[id number]   Note from Pharmacy:  **Patient requests 90 days supply**  Signed by Isamar Hanley CMA            ------------------------------------------  From: [facility] #[id number]  To: Brenda Jacobo  Sent: February 3, 2020 4:47:49 PM CST  Subject: Medication Management  Due: February 4, 2020 4:47:49 PM CST    ** On Hold Pending Signature **  Drug: sucralfate (sucralfate 1 g oral tablet)  TAKE 1 TABLET BY MOUTH FOUR TIMES DAILY  Quantity: 360 tab(s)  Days Supply: 30  Refills: 0  Substitutions Allowed  Notes from Pharmacy: **Patient requests 90 days supply**    Dispensed Drug: sucralfate (sucralfate 1 g oral tablet)  TAKE 1 TABLET BY MOUTH FOUR TIMES DAILY  Quantity: 360 tab(s)  Days Supply: 30  Refills: 0  Substitutions Allowed  Notes from Pharmacy: **Patient requests 90 days supply**    ** On Hold Pending Signature **  Drug: losartan (losartan 25 mg oral tablet)  TAKE 1 TABLET BY MOUTH DAILY  Quantity: 30 tab(s)  Days Supply: 30  Refills: 0  Substitutions Allowed  Notes from Pharmacy:     Dispensed Drug: losartan (losartan 25 mg oral tablet)  TAKE 1 TABLET BY MOUTH DAILY  Quantity: 30 tab(s)  Days Supply: 30  Refills: 0  Substitutions Allowed  Notes from Pharmacy:   ---------------------------------------------------------------  From: Isamar Hanley CMA (eRx Pool (32224_[facility]))   To: [facility] Message Pool (32224_[facility]);     Sent: 2/4/2020 10:51:54 AM CST  Subject: FW: Medication Management   Due Date/Time: 2/4/2020 4:47:00 PM CST---------------------  From: Suma Lockwood ([employer] Message Pool  (32224_[facility]))   To: Brenda Jacobo;     Sent: 2/6/2020 1:45:33 PM CST  Subject: FW: Medication Management   Due Date/Time: 2/4/2020 4:47:00 PM CST---------------------  From: Brenda Jacobo   To: [facility] Pool (32224_[facility]);     Sent: 2/6/2020 5:01:47 PM CST  Subject: RE: Medication Management     No refill, this med was stopped due to low blood pressure---------------------  From: Brenda Jacobo   To: [facility] #[id number]    Sent: 2/6/2020 5:02:08 PM CST  Subject: FW: Medication Management     ** Not Approved: Refill not appropriate, this med was discontinued **  losartan (LOSARTAN 25MG TABLETS)  TAKE 1 TABLET BY MOUTH DAILY  Qty:  30 tab(s)        Days Supply:  30        Refills:  0          Substitutions Allowed     Route To Pharmacy - [facility] #[id number]

## 2022-02-16 NOTE — PROGRESS NOTES
Patient:   MADELYN BRIONES            MRN: [id number]            FIN: [id number]               Age:   39 years     Sex:  Female     :  1980   Associated Diagnoses:   Hospital discharge follow-up; Alcohol abuse; Pancreatitis   Author:   Brenda Jacobo      Visit Information      Date of Service: 2020 02:08 pm  Performing Location: [facility]  Encounter#: [id number]      Primary Care Provider (PCP):  Brenda Jacobo    NPI# [id number]      Referring Provider:  Brenda Jacobo NPI# [id number]      Chief Complaint   2020 2:21 PM CST    here in f/u of recent hosptial stay for pancreatitis        History of Present Illness   reviewed presenting problem as above with patient  Madelyn was admitted to [facility]  and discharged  for pancreatitis, alcohol abuse, GERD with esophagitis IDC, pancytopenia and major depressive disorder. She is here in f/u. Her discharge summary is reviewed with her.  She feels better than she was when she went in the hospital but she continues to have upper abdominal pain at bedtime and evening. She tries to sleep in recliner. No vomiting. She can't understand why she was on morphine drip in hospital but they gave her nothing to go home with . She is using some tylenol  but it isn't helpful. She was able to advance her diet. She is taking the Protonix bid and Carafate QID. She hasn't tried the Zofran as 'that is for sleep or nausea' and denies nausea but abdominal pain is significant  she is waiting to hear from the [facility] to set up EGD  Her [place] paperwork has been completed  She has an appointment with [facility] for substance disorder treatment on  in [place] and will start the program that day. She feels like she would like to do the full day program and I encouraged that. She was not able to get in the inpatient program at [facility] as their was a long wait  her  Damian is very supportive. All the alcohol has been removed from the  house      Health Status   Allergies:    Allergic Reactions (Selected)  Severity Not Documented  Azithromycin (No reactions were documented)   Medications:  (Selected)   Prescriptions  Prescribed  Carafate 1 g oral tablet: = 1 tab(s) ( 1 gm ), Oral, qid, # 120 tab(s), 0 Refill(s), Type: Maintenance, Pharmacy: [facility] #[id number], 1 tab(s) Oral qid,x30 day(s)  NexIUM 20 mg oral delayed release capsule: = 1 cap(s) ( 20 mg ), Oral, bid, # 90 cap(s), 0 Refill(s), Type: Maintenance, Pharmacy: "[facility] #[id number]  oxyCODONE 5 mg oral capsule: See Instructions, Instructions: 1 -2 cap(s) Oral in the evening for severe pain, # 12 cap(s), 0 Refill(s), Type: Maintenance, Pharmacy: "[facility] #[id number], 1 -2 cap(s) Oral in the evening for severe pain  Documented Medications  Documented  Protonix: ( 40 mg ), Oral, bid, Instructions: Take before meals - per RFA discharge, 0 Refill(s), Type: Maintenance  Tylenol: ( 650 mg ), Oral, q4 hrs, Instructions: not to exceed 4000 mg/day - per RFA discharge, PRN: as needed for mild pain, 0 Refill(s), Type: Maintenance  ondansetron: ( 4 mg ), Oral, q8 hrs, Instructions: per [facility] discharge, PRN: as needed for nausea/vomiting, 0 Refill(s), Type: Maintenance   Problem list:    All Problems  Alcohol abuse / SNOMED CT 91367335 / Confirmed  Hospitalized [facility]  BMI 40.0-44.9, adult / ICD-9-CM V85.41 / Confirmed  Congenital pes planovalgus / SNOMED CT 77630053 / Confirmed  [facility] (Dr Renetta Martin)  Elevated creatine kinase / SNOMED CT 2030593011 / Confirmed  Fatty liver / SNOMED CT 176554363 / Confirmed  Grief / SNOMED CT 293785294 / Confirmed  HTN (hypertension) / SNOMED CT 8595287170 / Confirmed  Hyperlipemia / SNOMED CT 01613976 / Confirmed  Menarche / SNOMED CT 11235939 / Confirmed  Neuritis of foot / SNOMED CT 742427342 / Confirmed  R foot - [facility] (Dr Renetta Martin)  Obesity / ICD-9-.00 / Confirmed  PCOS (polycystic ovarian syndrome) / SNOMED CT  220462106 / Confirmed  Seasonal allergies / SNOMED CT 575417590 / Confirmed  Inactive: History of abnormal cervical Pap smear / SNOMED CT 6689318865  Resolved: Inpatient stay / SNOMED CT 532512952  @[facility], [place] - Acute kidney injury with alcohol withdrawal  Resolved: Inpatient stay / SNOMED CT 589329337  @[facility], [place] - Suicidal behavior without attempted self-injury  Resolved: Inpatient stay / SNOMED CT 287696390  @[facility], [place] - Alcohol use disorder, severe  Resolved: Inpatient stay / SNOMED CT 602887271  @[facility], [place] - Alcoholic ketoacidosis  Resolved: Inpatient stay / SNOMED CT 105453666  @[facility], [place] - Alcohol induced acute pancreatitis.  Canceled: Cervical dysplasia / SNOMED CT 450889972  Severe with LEEP/cone biopsy.      Histories   Past Medical History:    Active  Neuritis of foot (824427005): Onset in the month of 2/2014 at 33 years  Comments:  2/20/2014 CST 1:59 PM CST - Saray George LPN - [facility] (Dr Renetta Martin)  Congenital pes planovalgus (31741609): Onset in the month of 2/2014 at 33 years  Comments:  2/20/2014 CST 2:00 PM RON - Saray George LPN (Dr Renetta Martin)  HTN (hypertension) (5352300602): Onset on 9/12/2013 at 33 years.  PCOS (polycystic ovarian syndrome) (524269749): Onset on 2/1/2007 at 26 years.  Menarche (15213715): Onset in 1995 at 15 years.  Seasonal allergies (210997795)  Resolved  Inpatient stay (809474691): Onset on 2/13/2020 at 39 years.  Resolved on 2/18/2020 at 39 years.  Comments:  2/18/2020 CST 2:16 PM CST - Sybil Simms  @[facility], [place] - Alcohol induced acute pancreatitis.  Inpatient stay (031995141): Onset on 1/27/2020 at 39 years.  Resolved on 1/30/2020 at 39 years.  Comments:  2/4/2020 CST 12:44 PM CST - Sybil Simms  @[facility], [place] - Alcoholic ketoacidosis  Inpatient stay (442349569): Onset on  2019 at 38 years.  Resolved on 2019 at 38 years.  Comments:  2019 CDT 5:43 AM CDT - Lisa Garduno  @[facility], [place] - Alcohol use disorder, severe  Inpatient stay (961340579): Onset on 2019 at 38 years.  Resolved on 2019 at 38 years.  Comments:  2019 CDT 7:44 AM CDT - Lisa Garduno  @[facility], [place] - Suicidal behavior without attempted self-injury  Inpatient stay (859490173): Onset on 2019 at 38 years.  Resolved on 2019 at 38 years.  Comments:  2019 CDT 6:50 AM CDT - Lisa Garduno  @[place], [place] - Acute kidney injury with alcohol withdrawal   Family History:    Gout  Father  Diabetes mellitus  Grandmother (M)  Hypertension  Father  Hypothyroidism  Mother ()  Heart attack  Mother (): onset at 53 .  Multiple sclerosis  Mother ()  [place] - Cancer of prostate  Father: onset at 60 .     Procedure history:    Tonsillectomy and adenoidectomy in  at 9 Years.  LEEP (SNOMED CT 98543767).  [place] tooth (SNOMED CT 67784818).   Social History:        Alcohol Assessment: Current            Current                     Comments:                      2013 - Norma Forrest RN                     1 -3 glasses of wine on some week ends some times      Tobacco Assessment            Past                     Comments:                      2014 - Saray George LPN                     occasional previous tobacco use      Substance Abuse Assessment            Never      Employment and Education Assessment            Employed, Work/School description: Social Work.      Home and Environment Assessment            Marital status: .  Spouse/Partner name: Damian Mcgee.  Lives with Spouse.      Nutrition and Health Assessment            Type of diet: Regular.      Exercise and Physical Activity Assessment                     Comments:                      2015 - Rina Cain RN                      No regular exercise      Sexual Assessment            Sexually active: Yes.  Sexual orientation: Heterosexual.  Uses condoms: No.        Physical Examination   Vital Signs   2/19/2020 2:21 PM CST Temperature Tympanic 98.5 DegF    Peripheral Pulse Rate 100 bpm    Systolic Blood Pressure 128 mmHg    Diastolic Blood Pressure 96 mmHg  HI    Mean Arterial Pressure 107 mmHg    BP Site Right arm    BP Method Manual    Oxygen Saturation 98 %      Measurements from flowsheet : Measurements   2/19/2020 2:21 PM CST Height Measured - Standard 64.5 in    Height/Length Estimated 64.5    Weight Measured - Standard 223.2 lb    BSA 2.14 m2    Body Mass Index 37.72 kg/m2  HI      General:  Alert and oriented, No acute distress.    Eye:  Normal conjunctiva.    Neck:  Supple, Non-tender, No lymphadenopathy.    Respiratory:  Lungs are clear to auscultation, Respirations are non-labored, Breath sounds are equal, Symmetrical chest wall expansion.    Cardiovascular:  Normal rate, Regular rhythm, No murmur.    Musculoskeletal:  Normal range of motion, Normal gait.    Integumentary:  Warm, Dry, Pink, No rash.    Neurologic:  Alert, Oriented, Normal sensory, Normal motor function.    Psychiatric:  Cooperative, Appropriate mood & affect.       Review / Management   Documentation reviewed:  Records from referring facility, hospital DC summary reviewed.       Impression and Plan   Diagnosis     Hospital discharge follow-up (HQJ69-LG Z09).     Alcohol abuse (EWV09-RM F10.10).     Pancreatitis (FTH05-YE K85.90).     Patient Instructions:       Counseled: Patient, Regarding diagnosis, Regarding treatment, Regarding medications, Verbalized understanding, Discharge medication reviewed with patient and reconciled, emphasized that she needs to take the protonoix and carafate  also advised Zofran in the evening for the abdominal pain  I am will ing to give her a small supply of Oxycodone for the abdominal pain in the evening only  she would  have to see me for any more refills. The [facility] site was checked, no suspicious behavior  The [facility] was called by my staff specifically to make sure they contact her ASAP for EGD, she should expect a call directly from them today or tomorrow.  [facility] app[facility]  See me in 10-12 days, sooner if any worsening.

## 2022-02-16 NOTE — TELEPHONE ENCOUNTER
"---------------------  From: Kaycee Parikh LPN DUDLEY (Phone Messages Pool ([id number]_[facility]))   To: [facility] Message Pool ([id number]_[facility]);     Sent: 3/20/2020 10:09:36 AM CDT  Subject: update     Phone Message    PCP:   LISS      Time of Call:  9:51am       Person Calling:  pt  ?  Phone number:  [phone]    Note:   Caller LM stating pt has a phone appt today with [facility] at 12:20. Calller says pt has been drinking again and she wants to do to detox. He says she has already left once so is unsure if she would stay. caller says pt is shaking and thinks she has \"DT's\"- he does not think that at all- he believes her kidneys are bad again.     Caller says pt has not eaten in 5 days. Pt has been incontinent most of the time- urinating on bed, floor couch.     No vomiting just shaky. He thinks pt has an electrolyte imbalance and that her kidneys are \"screwed up.\" He wonders about getting labs done but is unsure if she would even go.    Caller says pt wants him to get her more booze and he does not know what to do. He says he knows the hospital is not a good place to be right now and he has no control over her. He says she is violent if he does not buy her booze.    Caller says [facility] can call him if she wants. He says he assumes [facility] will talk to pt today unless pt leaves and goes to a hotel and buys booze because he will not buy it for her and she will drink herself to sleep.    Last office visit and reason:  3/5/20 substance abuse---------------------  From: Saray George LPN ([facility] Message Pool ([id number]_[facility]))   To: Brenda Jacobo;     Sent: 3/20/2020 10:14:39 AM CDT  Subject: FW: Marquez was unable to reach her for telemed visit at 12:05 today and could not leave a messageI called Damian () phone below.  He answered and put Madelyn on the phone so I could talk to her. Her speech was slurred. She told me she had been drinking. She confirmed she knows she needs to stop. I informed her she needs to " go to the ER and get cleared medically before she can go to rehab.  Given the reported state per Damian, she is likely dehydrated and will require admission. She then put Damian on the line and I asked if he would take her to ER if she will cooperate. he will try, he is also considering getting her committed but has reservations. I asked him to pursue all avenues as Madelyn is a danger to herself medically with her alcoholism, he is in agreement.

## 2022-02-16 NOTE — PROGRESS NOTES
Patient:   UNA BRIONES            MRN: [id number]            FIN: [id number]               Age:   38 years     Sex:  Female     :  1980   Associated Diagnoses:   Nasal congestion; Nasal sore   Author:   Brenda Jacobo      Visit Information      Date of Service: 2018 03:28 pm  Performing Location: [facility]  Encounter#: [id number]      Primary Care Provider (PCP):  Brenda Jacobo    NPI# [id number]      Referring Provider:  Brenda Jacobo    NPI# [id number]      Chief Complaint   2018 3:37 PM CST   Having concerns with right nostril. Raw w/ a scab.        History of Present Illness   reviewed presenting problem as above with patient  symptoms onset 1 month ago, no fever, started as a head cold but URI symptoms improved  Painful to touch right side of nose  She has long hx for 10 years of nasal congestion right nostril, uses AFrin, 'more than I should' but it is the only thing that helps--steroid nasal spray and netipot not helping. Symptoms of difficulty breathing only at night and only right nares      Review of Systems   Constitutional:  No fever, No chills.    Ear/Nose/Mouth/Throat:  No ear pain, No sore throat.    Respiratory:  No shortness of breath, No cough, No wheezing.    Gastrointestinal:  No nausea, No vomiting, No diarrhea.              Health Status   Allergies:    Allergic Reactions (Selected)  Severity Not Documented  Zithromax (Face swelling)   Medications:  (Selected)   Prescriptions  Prescribed  Bactroban 2% nasal ointment: 1 stormy, Nasal, BID, Instructions: oint or cream most affordable, # 1 gm, 0 Refill(s), Type: Maintenance, Pharmacy: [id number], 1 stormy Nasal bid,x10 day(s),Instr:oint or cream most affordable  Provera 10 mg oral tablet: See Instructions, Instructions: 1 tab daily x10 days every 3 months, # 10 tab(s), 3 Refill(s), Type: Maintenance, Pharmacy: [id number], 1 tab daily x10 days every 3 months  cephalexin  500 mg oral capsule: = 1 cap(s) ( 500 mg ), PO, TID, # 30 tab(s), 0 Refill(s), Type: Maintenance, Pharmacy: [id number], 1 cap(s) Oral tid,x10 day(s)  lisinopril 20 mg oral tablet: = 1 tab(s) ( 20 mg ), po, daily, # 90 tab(s), 1 Refill(s), Type: Maintenance, Pharmacy: [id number], 1 tab(s) Oral daily  Documented Medications  Documented  Afrin Nasal Sinus: 2 spray(s), nasal, bid, 0 Refill(s), Type: Maintenance   Problem list:    All Problems  PCOS (Polycystic Ovarian Syndrome) / ICD-9-.4 / Confirmed  Hyperlipemia / SNOMED CT 53932217 / Confirmed  Obesity / ICD-9-.00 / Confirmed  BMI 40.0-44.9, adult / ICD-9-CM V85.41 / Confirmed  HTN (Hypertension) / ICD-9-.9 / Confirmed  Neuritis of foot / SNOMED CT 873818124 / Confirmed  R foot - [facility] (Dr Renetta Martin)  Congenital pes planovalgus / SNOMED CT 09786550 / Confirmed  [facility] (Dr Renetta Martin)  Menarche / SNOMED CT 07153993 / Confirmed  Seasonal allergies / SNOMED CT 913948902 / Confirmed  Fatty liver / SNOMED CT 001981349 / Confirmed  Inactive: History of abnormal pap smear  Canceled: Cervical dysplasia / SNOMED CT 868576021  Severe with LEEP/cone biopsy.      Histories   Past Medical History:    Active  Neuritis of foot (413856383): Onset in the month of 2014 at 33 years  Comments:  2014 CST 1:59 PM CST - Saray George CMA  R foot - [facility] (Dr Renetta Martin)  Congenital pes planovalgus (91025307): Onset in the month of 2014 at 33 years  Comments:  2014 CST 2:00 PM CST Saray Earl CMA  [facility] (Dr Renetta Martin)  PCOS (Polycystic Ovarian Syndrome) (256.4): Onset on 2007 at 26 years.  Menarche (09739456): Onset in  at 15 years.  Seasonal allergies (086440646)   Family History:    Gout  Father  Diabetes mellitus  Grandmother (M)  Hypertension  Father  Hypothyroidism  Mother ()  Heart attack  Mother (): onset at 53 .  Multiple sclerosis  Mother  ()  [place] - Cancer of prostate  Father: onset at 60 .     Procedure history:    Tonsillectomy and adenoidectomy in  at 9 Years.  LEEP (SNOMED CT 10219499).  [place] tooth (SNOMED CT 44723777).   Social History:        Alcohol Assessment: Current            Current                     Comments:                      2013 - Norma Forrest RN                     1 -3 glasses of wine on some week ends some times      Tobacco Assessment            Past                     Comments:                      2014 - Saray George CMA                     occasional previous tobacco use      Substance Abuse Assessment            Never      Employment and Education Assessment            Employed, Work/School description: Social Work.      Home and Environment Assessment            Marital status: .  Spouse/Partner name: Damian Mcgee.  Lives with Spouse.      Nutrition and Health Assessment            Type of diet: Regular.      Exercise and Physical Activity Assessment                     Comments:                      2015 - Rina Cain RN                     No regular exercise      Sexual Assessment            Sexually active: Yes.  Sexual orientation: Heterosexual.  Uses condoms: No.      Physical Examination   Vital Signs   2018 3:37 PM CST Temperature Tympanic 98.8 DegF    Peripheral Pulse Rate 80 bpm    Systolic Blood Pressure 132 mmHg  HI    Diastolic Blood Pressure 86 mmHg  HI    Mean Arterial Pressure 101 mmHg    BP Site Right arm    BP Method Manual    Oxygen Saturation 98 %      Measurements from flowsheet : Measurements   2018 3:37 PM CST Height/Length Estimated 64.5 in    Weight Measured - Standard 225 lb      General:  Alert and oriented, No acute distress.    Eye:  Normal conjunctiva.    HENT:  Tympanic membranes are clear, Normal hearing, Oral mucosa is moist, No pharyngeal erythema, No sinus tenderness, right outer nose tender to ctouch and a little  swollen, nasal passage nearly completely closed on right, open without inflamation on left.    Neck:  Supple, Non-tender, No lymphadenopathy.    Respiratory:  Lungs are clear to auscultation, Respirations are non-labored, Breath sounds are equal, Symmetrical chest wall expansion.    Cardiovascular:  Normal rate, Regular rhythm, No murmur.    Musculoskeletal:  Normal range of motion, Normal gait.    Integumentary:  Warm, Dry, Pink, No rash.    Neurologic:  Alert, Oriented.    Psychiatric:  Cooperative.       Impression and Plan   Diagnosis     Nasal congestion (IVC93-GP R09.81).     Nasal sore (NQU61-ES J34.89).     Plan:  suspect she has a skin infection in the nasal passage but also suspicious for underlying nasal polyps  advised she start antibioitc and see ENT.    Patient Instructions:       Counseled: Patient, Regarding diagnosis, Regarding treatment, Regarding medications, Verbalized understanding, Counseled on symptomatic management. Return to clinic for re evaluation if worsening, simply not improving, or failure to resolve.   .    Orders     Orders (Selected)   Outpatient Orders  Ordered  ENT Consult (Request): Referred to: ENT, Nasal congestion  Prescriptions  Prescribed  Bactroban 2% nasal ointment: 1 stormy, Nasal, BID, Instructions: oint or cream most affordable, # 1 gm, 0 Refill(s), Type: Maintenance, Pharmacy: [id number], 1 stormy Nasal bid,x10 day(s),Instr:oint or cream most affordable  cephalexin 500 mg oral capsule: = 1 cap(s) ( 500 mg ), PO, TID, # 30 tab(s), 0 Refill(s), Type: Maintenance, Pharmacy: [id number], 1 cap(s) Oral tid,x10 day(s).         she is due for chem 8 for HTn but wants to wait till fasting next year.

## 2022-02-16 NOTE — PROGRESS NOTES
Chief Complaint    check lump on lower right back--was seen by chiropractor and was thought to have lipoma.  History of Present Illness      Patient is here is concerned about a lipoma of the right low back.  Chiropractor suggested she has inspected.  Initially she said this was a muscle knot and had a deep tissue massage.  This seemed to irritate the area.  She has intermittent pain.  Review of Systems      See HPI.  All other review of systems negative.  Physical Exam   Vitals & Measurements    T: 98.9(Tympanic)  HR: 88(Peripheral)  BP: 132/90     HT: 64.5 in  WT: 247.4 lb  BMI: 41.81       Alert, oriented, no acute distress      Nonlabored breathing      Heart has a regular rate and rhythm       2 cm lipoma right lumbar area, slight tenderness          Assessment/Plan       Lipoma of back         Advised observation and avoidance of manipulation of the area.  She will follow-up if symptoms do not continue to improve.  Patient Information     Name:UNA BRIONES      Address:      [address] [place], [place] [id number]     Sex:Female     YOB: 1980     [phone]     Emergency Contact:ERVIN BRIONES     MRN:[id number]     FIN:[id number]     Location:[facility]     Date of Service:12/05/2017      Primary Care Physician:       Brenda Jacobo, [phone]  Problem List/Past Medical History    Ongoing     BMI 40.0-44.9, adult     Congenital pes planovalgus      Comments: [facility] (Dr Renetta Martin)     Fatty liver     History of abnormal pap smear     HTN (Hypertension)     Hyperlipemia     Menarche     Neuritis of foot      Comments: R foot - [facility] (Dr Renetta Martin)     Obesity     PCOS (Polycystic Ovarian Syndrome)     Seasonal allergies    Historical  Procedure/Surgical History     Tonsillectomy and adenoidectomy (1989)     LEEP     [place] tooth  Medications    Afrin Nasal Sinus, 2 spray(s), nasal, bid    lisinopril 20 mg oral tablet, 20 mg=  1 tab(s), po, daily    lisinopril 20 mg oral tablet, See Instructions    metFORMIN 500 mg oral tablet, See Instructions    metFORMIN 500 mg oral tablet, 500 mg= 1 tab(s), po, bid    NuvaRing 0.120 mg-0.015 mg/24 hours vaginal ring, 1 EA, vag, q4 wks, 1 refills  Allergies    Zithromax (face swelling)  Social History    Smoking Status - 12/05/2017     Former smoker     Alcohol - Current, 08/20/2015      Current     Employment and Education - 08/20/2015      Employed, Work/School description: Social Work.     Exercise and Physical Activity - 08/20/2015     Home and Environment - 08/20/2015      Marital status: . Spouse/Partner name: Damian Mcgee. Lives with Spouse.     Nutrition and Health - 08/20/2015      Type of diet: Regular.     Sexual - 08/20/2015      Sexually active: Yes. Sexual orientation: Heterosexual. Uses condoms: No.     Substance Abuse - 08/20/2015      Never     Tobacco - 08/20/2015      Past  Family History    CA - Cancer of prostate: Father (Dx at 60).    Diabetes mellitus: Grandmother (M).    Gout: Father.    Heart attack: Mother (Dx at 53).    Hypertension: Father.    Hypothyroidism: Mother.    Multiple sclerosis: Mother.  Immunizations      Vaccine Date Status      tetanus/diphth/pertuss (Tdap) adult/adol 12/29/2015 Recorded      influenza virus vaccine, inactivated 09/12/2013 Given      tetanus/diphth/pertuss (Tdap) adult/adol 06/01/2006 Recorded      MMR (measles/mumps/rubella) 12/12/1983 Recorded  Lab Results   Results (Last 90 days)             Laboratory                  Chemistry                       General Chemistry                            A/G Ratio:      1.4   (09/28/17 10:20 AM CDT)                                                                                                                                       ALT/SGPT:      H 91 unit/L (Range 6 - 29)  (09/28/17 10:20 AM CDT)                                                                                                                                        AST/SGOT:      H 71 unit/L (Range 10 - 30)  (09/28/17 10:20 AM CDT)                                                                                                                                       Albumin Level:      4.4 gm/dL  (09/28/17 10:20 AM CDT)                                                                                                                                       Alkaline Phosphatase:      49 unit/L  (09/28/17 10:20 AM CDT)                                                                                                                                       BUN:      16 mg/dL  (09/28/17 10:20 AM CDT)                                                                                                                                       BUN/Creat Ratio:      NOT APPLICABLE   (09/28/17 10:20 AM CDT)                                                                                                                                       Basic Metabolic Profile:         (09/28/17 10:20 AM CDT)                                                                                                                                       Bilirubin Total:      1.2 mg/dL  (09/28/17 10:20 AM CDT)                                                                                                                                       CO2 Level:      23 mmol/L  (09/28/17 10:20 AM CDT)                                                                                                                                       Calcium Level:      9.6 mg/dL  (09/28/17 10:20 AM CDT)                                                                                                                                       Chloride Level:      L 94 mmol/L (Range 98 - 110)  (09/28/17 10:20 AM CDT)                                                                                                                                        Creatinine Level:      1.07 mg/dL  (09/28/17 10:20 AM CDT)                                                                                                                                       Globulin:      3.1   (09/28/17 10:20 AM CDT)                                                                                                                                       Glucose Level:      H 101 mg/dL (Range 65 - 99)  (09/28/17 10:20 AM CDT)                                                                                                                                       Hgb A1c                                     (09/28/17 10:20 AM CDT)                                                                                                                                             4.9   (09/28/17 10:20 AM CDT)                                                                                                                                       Potassium Level:      4.9 mmol/L  (09/28/17 10:20 AM CDT)                                                                                                                                       Protein Total:      7.5 gm/dL  (09/28/17 10:20 AM CDT)                                                                                                                                       Sodium Level:      L 129 mmol/L (Range 135 - 146)  (09/28/17 10:20 AM CDT)                                                                                                                                       eGFR:      67 mL/min/1.73m2  (09/28/17 10:20 AM CDT)                                                                                                                                       eGFR African American:      78 mL/min/1.73m2  (09/28/17 10:20 AM CDT)                                                                                                                                  Lipids                             Cholesterol:      H 256 mg/dL (Range  - <200)  (09/28/17 10:20 AM CDT)                                                                                                                                       Cholesterol/HDL Ratio:      3.7   (09/28/17 10:20 AM CDT)                                                                                                                                       HDL:      70 mg/dL  (09/28/17 10:20 AM CDT)                                                                                                                                       LDL:      H 162   (09/28/17 10:20 AM CDT)                                                                                                                                       Lipid Profile:         (09/28/17 10:20 AM CDT)                                                                                                                                       Non-HDL Cholesterol:      H 186  (Range  - <130)  (09/28/17 10:20 AM CDT)                                                                                                                                       Triglyceride:      119 mg/dL  (09/28/17 10:20 AM CDT)                                                                                                                                  Random Urine Chem                            U Osmolality                                     (11/29/17 02:22 PM CST)                                                                                                                                             542 mOsm/kg  (11/29/17 02:22 PM CST)                                                                                                                                       U Sodium:      41   (11/29/17 02:22 PM CST)                                                                                                                                        U Sodium Level:      100 mmol/L  (11/29/17 02:22 PM CST)                                                                                                                                  Timed Urine Chem                            Ur Creatinine:      242 mg/dL  (11/29/17 02:22 PM CST)                                                                                                                             Urinalysis                       UA Dipstick                            UA Bilirubin:      NEGATIVE   (11/29/17 02:19 PM CST)                                                                                                                                       UA Glucose:      NEGATIVE   (11/29/17 02:19 PM CST)                                                                                                                                       UA Ketones:      NEGATIVE   (11/29/17 02:19 PM CST)                                                                                                                                       UA Leukocyte Esterase:      NEGATIVE   (11/29/17 02:19 PM CST)                                                                                                                                       UA Nitrite:      NEGATIVE   (11/29/17 02:19 PM CST)                                                                                                                                       UA Protein:      NEGATIVE   (11/29/17 02:19 PM CST)                                                                                                                                       UA Specific Gravity:      1.020   (11/29/17 02:19 PM CST)                                                                                                                                       UA pH                                     (11/29/17 02:19 PM CST)                                                                                                                                              5.5   (11/29/17 02:19 PM CST)                                                                                                                                       Urine Occult Blood:      1+   (11/29/17 02:19 PM CST)                                                                                                                                  UA Microscopic                            UA Bacteria:      Moderate   (11/29/17 02:44 PM CST)                                                                                                                                       UA Epithelial Cells:      Few   (11/29/17 02:44 PM CST)                                                                                                                                       UA RBC:      0-2   (11/29/17 02:44 PM CST)                                                                                                                                       UA WBC:      3-5   (11/29/17 02:44 PM CST)

## 2023-12-19 NOTE — TELEPHONE ENCOUNTER
---------------------  From: Twyla Almazan CMA (Phone Messages Pool ([id number]_[facility]))   To: [facility] Message Pool ([id number]_[facility]);     Sent: 7/25/2019 4:04:39 PM CDT  Subject: scopolamine patch     Phone Message    PCP:   [facility]      Time of Call:  1551       Person Calling:  pt  Phone number:  asked to call  [phone]    Returned call at: _    Note:   Pt and  are taking cruise in about a week and asking to get rx sent to scopolamine patches. Can send to [facility]. Will come in for appt if needed.     Last office visit and reason:  5/24/19 hospital f/u [facility]---------------------  From: Donita Goddard CMA ([facility] Message Pool ([id number]_[facility]))   To: Brenda Jacobo;     Sent: 7/25/2019 4:22:41 PM CDT  Subject: FW: scopolamine patch---------------------  From: Brenda Jacobo   To: [facility] Message Pool ([id number]_[facility]);     Sent: 7/25/2019 4:48:33 PM CDT  Subject: RE: scopolamine patch     I sent rxCalled and LVM notifying that medication was sent       ELISE Goddard CMA   ED Admission Info  Admitting Dx:   1. Intractable abdominal pain        Admitting Status: Observation   Mental Status: Alert and oriented  Is a sitter required? no   C-SSRS:        Mobility (independent or requires assistance): Up with 1 Assist    Tele: yes  Current Rhythm: NS    Respiratory (O2 LPM, BiPap,Vent): NA    Isolation: Contact and Droplet with N95    Temp:  [99.5 °F (37.5 °C)] 99.5 °F (37.5 °C)  Heart Rate:  [] 89  Resp:  [19-20] 19  SpO2:  [94 %-96 %] 94 %  BP: (118-162)/(63-93) 127/65     Abnormal Labs:   No data recorded      Safety:     None    Dialysis:  N/A